# Patient Record
Sex: MALE | Race: WHITE | NOT HISPANIC OR LATINO | Employment: FULL TIME | URBAN - NONMETROPOLITAN AREA
[De-identification: names, ages, dates, MRNs, and addresses within clinical notes are randomized per-mention and may not be internally consistent; named-entity substitution may affect disease eponyms.]

---

## 2017-01-06 ENCOUNTER — HOSPITAL ENCOUNTER (EMERGENCY)
Facility: HOSPITAL | Age: 67
Discharge: HOME | End: 2017-01-06
Admitting: EMERGENCY MEDICINE
Payer: COMMERCIAL

## 2017-01-06 VITALS
DIASTOLIC BLOOD PRESSURE: 81 MMHG | BODY MASS INDEX: 30.57 KG/M2 | HEART RATE: 64 BPM | WEIGHT: 194.8 LBS | SYSTOLIC BLOOD PRESSURE: 185 MMHG | HEIGHT: 67 IN | OXYGEN SATURATION: 97 % | RESPIRATION RATE: 18 BRPM | TEMPERATURE: 97 F

## 2017-01-06 DIAGNOSIS — R10.30 INGUINAL PAIN: ICD-10-CM

## 2017-01-06 DIAGNOSIS — R31.9 HEMATURIA: Primary | ICD-10-CM

## 2017-01-06 LAB
BACTERIA UR QL AUTO: ABNORMAL /HPF
BILIRUB UR QL STRIP: NEGATIVE
CLARITY UR: ABNORMAL
COLOR UR: YELLOW
GLUCOSE UR STRIP-MCNC: NEGATIVE MG/DL
HGB UR QL STRIP.AUTO: ABNORMAL
KETONES UR STRIP-MCNC: NEGATIVE MG/DL
LEUKOCYTE ESTERASE UR QL STRIP: NEGATIVE
NITRITE UR QL STRIP: NEGATIVE
NON-SQ EPI CELLS URNS QL MICRO: ABNORMAL /HPF
PH UR STRIP.AUTO: 6 [PH] (ref 4.5–8)
PROT UR STRIP-MCNC: NEGATIVE MG/DL
RBC #/AREA URNS AUTO: ABNORMAL /HPF
SP GR UR STRIP.AUTO: 1.02 (ref 1–1.03)
UROBILINOGEN UR QL STRIP.AUTO: 0.2 E.U./DL
WBC #/AREA URNS AUTO: ABNORMAL /HPF

## 2017-01-06 PROCEDURE — 81001 URINALYSIS AUTO W/SCOPE: CPT | Performed by: PHYSICIAN ASSISTANT

## 2017-01-06 PROCEDURE — 99283 EMERGENCY DEPT VISIT LOW MDM: CPT

## 2017-01-06 PROCEDURE — 87086 URINE CULTURE/COLONY COUNT: CPT | Performed by: PHYSICIAN ASSISTANT

## 2017-01-06 RX ORDER — AMLODIPINE BESYLATE 2.5 MG/1
2.5 TABLET ORAL DAILY
COMMUNITY

## 2017-01-06 RX ORDER — ATORVASTATIN CALCIUM 40 MG/1
40 TABLET, FILM COATED ORAL DAILY
COMMUNITY

## 2017-01-06 RX ORDER — NITROGLYCERIN 0.4 MG/1
0.4 TABLET SUBLINGUAL DAILY
COMMUNITY

## 2017-01-06 RX ORDER — GABAPENTIN 300 MG/1
300 CAPSULE ORAL 3 TIMES DAILY
COMMUNITY
End: 2018-03-08

## 2017-01-06 RX ORDER — KETOCONAZOLE 20 MG/G
CREAM TOPICAL AS NEEDED
COMMUNITY

## 2017-01-06 RX ORDER — OMEPRAZOLE 40 MG/1
40 CAPSULE, DELAYED RELEASE ORAL DAILY
COMMUNITY
End: 2018-03-08

## 2017-01-06 RX ORDER — PIMECROLIMUS 10 MG/G
CREAM TOPICAL 2 TIMES DAILY PRN
COMMUNITY

## 2017-01-06 RX ORDER — METOPROLOL TARTRATE 50 MG/1
25 TABLET, FILM COATED ORAL EVERY 12 HOURS SCHEDULED
COMMUNITY

## 2017-01-07 LAB — BACTERIA UR CULT: NORMAL

## 2018-01-03 ENCOUNTER — APPOINTMENT (EMERGENCY)
Dept: RADIOLOGY | Facility: HOSPITAL | Age: 68
End: 2018-01-03
Payer: OTHER MISCELLANEOUS

## 2018-01-03 ENCOUNTER — APPOINTMENT (EMERGENCY)
Dept: CT IMAGING | Facility: HOSPITAL | Age: 68
End: 2018-01-03
Payer: OTHER MISCELLANEOUS

## 2018-01-03 ENCOUNTER — HOSPITAL ENCOUNTER (EMERGENCY)
Facility: HOSPITAL | Age: 68
Discharge: HOME | End: 2018-01-03
Payer: OTHER MISCELLANEOUS

## 2018-01-03 VITALS
WEIGHT: 176.15 LBS | BODY MASS INDEX: 27.59 KG/M2 | TEMPERATURE: 97.9 F | RESPIRATION RATE: 18 BRPM | OXYGEN SATURATION: 97 % | HEART RATE: 60 BPM | DIASTOLIC BLOOD PRESSURE: 71 MMHG | SYSTOLIC BLOOD PRESSURE: 135 MMHG

## 2018-01-03 DIAGNOSIS — S63.692A OTHER SPRAIN OF RIGHT MIDDLE FINGER, INITIAL ENCOUNTER: ICD-10-CM

## 2018-01-03 DIAGNOSIS — S09.90XA HEAD INJURY, CLOSED, WITHOUT LOC, INITIAL ENCOUNTER: Primary | ICD-10-CM

## 2018-01-03 DIAGNOSIS — S63.614A SPRAIN OF RIGHT RING FINGER, INITIAL ENCOUNTER: ICD-10-CM

## 2018-01-03 DIAGNOSIS — S60.411A ABRASION OF LEFT INDEX FINGER, INITIAL ENCOUNTER: ICD-10-CM

## 2018-01-03 DIAGNOSIS — S80.01XA CONTUSION OF RIGHT KNEE, INITIAL ENCOUNTER: ICD-10-CM

## 2018-01-03 DIAGNOSIS — S00.83XA FOREHEAD CONTUSION, INITIAL ENCOUNTER: ICD-10-CM

## 2018-01-03 LAB
ALBUMIN SERPL BCP-MCNC: 4.2 G/DL (ref 3.5–5)
ALP SERPL-CCNC: 93 U/L (ref 46–116)
ALT SERPL W P-5'-P-CCNC: 47 U/L (ref 12–78)
ANION GAP SERPL CALCULATED.3IONS-SCNC: 6 MMOL/L (ref 4–13)
APTT PPP: 31 SECONDS (ref 23–35)
AST SERPL W P-5'-P-CCNC: 27 U/L (ref 5–45)
BACTERIA UR QL AUTO: ABNORMAL /HPF
BASOPHILS # BLD AUTO: 0.01 THOUSANDS/ΜL (ref 0–0.1)
BASOPHILS NFR BLD AUTO: 0 % (ref 0–1)
BILIRUB SERPL-MCNC: 0.9 MG/DL (ref 0.2–1)
BILIRUB UR QL STRIP: NEGATIVE
BUN SERPL-MCNC: 9 MG/DL (ref 5–25)
CALCIUM SERPL-MCNC: 8.7 MG/DL (ref 8.3–10.1)
CHLORIDE SERPL-SCNC: 104 MMOL/L (ref 100–108)
CLARITY UR: CLEAR
CO2 SERPL-SCNC: 31 MMOL/L (ref 21–32)
COLOR UR: YELLOW
CREAT SERPL-MCNC: 0.99 MG/DL (ref 0.6–1.3)
EOSINOPHIL # BLD AUTO: 0.14 THOUSAND/ΜL (ref 0–0.61)
EOSINOPHIL NFR BLD AUTO: 2 % (ref 0–6)
ERYTHROCYTE [DISTWIDTH] IN BLOOD BY AUTOMATED COUNT: 13.8 % (ref 11.6–15.1)
GFR SERPL CREATININE-BSD FRML MDRD: 77 ML/MIN/1.73SQ M
GLUCOSE SERPL-MCNC: 91 MG/DL (ref 65–140)
GLUCOSE UR STRIP-MCNC: NEGATIVE MG/DL
HCT VFR BLD AUTO: 45.3 % (ref 36.5–49.3)
HGB BLD-MCNC: 16 G/DL (ref 12–17)
HGB UR QL STRIP.AUTO: ABNORMAL
HOLD SPECIMEN: NORMAL
INR PPP: 1.08 (ref 0.86–1.16)
KETONES UR STRIP-MCNC: ABNORMAL MG/DL
LEUKOCYTE ESTERASE UR QL STRIP: NEGATIVE
LYMPHOCYTES # BLD AUTO: 1.24 THOUSANDS/ΜL (ref 0.6–4.47)
LYMPHOCYTES NFR BLD AUTO: 18 % (ref 14–44)
MCH RBC QN AUTO: 32.3 PG (ref 26.8–34.3)
MCHC RBC AUTO-ENTMCNC: 35.3 G/DL (ref 31.4–37.4)
MCV RBC AUTO: 91 FL (ref 82–98)
MONOCYTES # BLD AUTO: 0.96 THOUSAND/ΜL (ref 0.17–1.22)
MONOCYTES NFR BLD AUTO: 14 % (ref 4–12)
NEUTROPHILS # BLD AUTO: 4.61 THOUSANDS/ΜL (ref 1.85–7.62)
NEUTS SEG NFR BLD AUTO: 66 % (ref 43–75)
NITRITE UR QL STRIP: NEGATIVE
NON-SQ EPI CELLS URNS QL MICRO: ABNORMAL /HPF
PH UR STRIP.AUTO: 7.5 [PH] (ref 4.5–8)
PLATELET # BLD AUTO: 202 THOUSANDS/UL (ref 149–390)
PMV BLD AUTO: 9.5 FL (ref 8.9–12.7)
POTASSIUM SERPL-SCNC: 3.6 MMOL/L (ref 3.5–5.3)
PROT SERPL-MCNC: 7.4 G/DL (ref 6.4–8.2)
PROT UR STRIP-MCNC: NEGATIVE MG/DL
PROTHROMBIN TIME: 13.9 SECONDS (ref 12.1–14.4)
RBC # BLD AUTO: 4.96 MILLION/UL (ref 3.88–5.62)
RBC #/AREA URNS AUTO: ABNORMAL /HPF
SODIUM SERPL-SCNC: 141 MMOL/L (ref 136–145)
SP GR UR STRIP.AUTO: 1.01 (ref 1–1.03)
UROBILINOGEN UR QL STRIP.AUTO: 0.2 E.U./DL
WBC # BLD AUTO: 6.96 THOUSAND/UL (ref 4.31–10.16)
WBC #/AREA URNS AUTO: ABNORMAL /HPF

## 2018-01-03 PROCEDURE — 71260 CT THORAX DX C+: CPT

## 2018-01-03 PROCEDURE — 85025 COMPLETE CBC W/AUTO DIFF WBC: CPT | Performed by: PHYSICIAN ASSISTANT

## 2018-01-03 PROCEDURE — 70450 CT HEAD/BRAIN W/O DYE: CPT

## 2018-01-03 PROCEDURE — 74177 CT ABD & PELVIS W/CONTRAST: CPT

## 2018-01-03 PROCEDURE — 81001 URINALYSIS AUTO W/SCOPE: CPT | Performed by: PHYSICIAN ASSISTANT

## 2018-01-03 PROCEDURE — 73110 X-RAY EXAM OF WRIST: CPT

## 2018-01-03 PROCEDURE — 73080 X-RAY EXAM OF ELBOW: CPT

## 2018-01-03 PROCEDURE — 36415 COLL VENOUS BLD VENIPUNCTURE: CPT | Performed by: PHYSICIAN ASSISTANT

## 2018-01-03 PROCEDURE — 85730 THROMBOPLASTIN TIME PARTIAL: CPT | Performed by: PHYSICIAN ASSISTANT

## 2018-01-03 PROCEDURE — 73564 X-RAY EXAM KNEE 4 OR MORE: CPT

## 2018-01-03 PROCEDURE — 80053 COMPREHEN METABOLIC PANEL: CPT | Performed by: PHYSICIAN ASSISTANT

## 2018-01-03 PROCEDURE — 70486 CT MAXILLOFACIAL W/O DYE: CPT

## 2018-01-03 PROCEDURE — 93005 ELECTROCARDIOGRAM TRACING: CPT | Performed by: PHYSICIAN ASSISTANT

## 2018-01-03 PROCEDURE — 73130 X-RAY EXAM OF HAND: CPT

## 2018-01-03 PROCEDURE — 72125 CT NECK SPINE W/O DYE: CPT

## 2018-01-03 PROCEDURE — 85610 PROTHROMBIN TIME: CPT | Performed by: PHYSICIAN ASSISTANT

## 2018-01-03 PROCEDURE — 99284 EMERGENCY DEPT VISIT MOD MDM: CPT

## 2018-01-03 RX ORDER — ACETAMINOPHEN 325 MG/1
650 TABLET ORAL EVERY 6 HOURS PRN
Qty: 30 TABLET | Refills: 0 | Status: SHIPPED | OUTPATIENT
Start: 2018-01-03

## 2018-01-03 RX ORDER — ACETAMINOPHEN 325 MG/1
650 TABLET ORAL ONCE
Status: COMPLETED | OUTPATIENT
Start: 2018-01-03 | End: 2018-01-03

## 2018-01-03 RX ADMIN — IOHEXOL 100 ML: 350 INJECTION, SOLUTION INTRAVENOUS at 19:11

## 2018-01-03 RX ADMIN — ACETAMINOPHEN 650 MG: 325 TABLET, FILM COATED ORAL at 20:40

## 2018-01-04 LAB
ATRIAL RATE: 56 BPM
P AXIS: 72 DEGREES
PR INTERVAL: 164 MS
QRS AXIS: 24 DEGREES
QRSD INTERVAL: 100 MS
QT INTERVAL: 442 MS
QTC INTERVAL: 426 MS
T WAVE AXIS: 28 DEGREES
VENTRICULAR RATE: 56 BPM

## 2018-01-04 NOTE — ED PROVIDER NOTES
History  Chief Complaint   Patient presents with    Fall     Pt fell from last step on ladder  Denies any LOC  20-year-old male presents to the emergency department today for evaluation following a head injury  Injury occurred approximately 5 hours ago  Patient states he was stepping down off of a step ladder at work he missed took the last step on the ladder striking the right side of his forehead against the ladder itself before falling to the ground onto his back  He denies any loss of consciousness  Patient is on 81 mg aspirin daily no other anticoagulation or antiplatelet agents  He does report a mild/moderate dull headache on the right forehead and behind the right eye  Has no associated blurred vision or double vision  No dizziness prior to or since the fall  Denies any neck or back pain  He is having pain in the right third and fourth fingers at the site where he tried to grab onto the ladder and struck the knuckles  He is also having pain in the right anterior knee where he struck the ground  He is able to ambulate without difficulty  He denies prior history of head injury, neck injury or other spinal surgery  Patient had initially presented to another facility with his co-workers for evaluation of the injury, however he waited in the waiting room for several hours without being seen, at that point he decided to come home with his wife in be seen at this facility as it is closer to home  This injury will be a worker's Comp claim  Patient denies any difficulty breathing, cough or rib pain  He does not have abdominal pain  He has had normal urination since the time of injury, not had a bowel movement since time of injury  He did eat a small snack and had fluids fine  History provided by:  Patient, spouse and medical records      Prior to Admission Medications   Prescriptions Last Dose Informant Patient Reported? Taking?    Calcium Citrate-Vitamin D (CALCIUM CITRATE + PO)   Yes No Sig: Take 500 mg by mouth daily   Cholecalciferol 1000 UNITS CHEW   Yes No   Sig: Chew 1,000 Units daily   amLODIPine (NORVASC) 2 5 mg tablet   Yes No   Sig: Take 2 5 mg by mouth daily   aspirin 81 MG tablet   Yes No   Sig: Take 81 mg by mouth daily   atorvastatin (LIPITOR) 40 mg tablet   Yes No   Sig: Take 40 mg by mouth daily   co-enzyme Q-10 30 MG capsule   Yes No   Sig: Take 100 mg by mouth daily   gabapentin (NEURONTIN) 300 mg capsule   Yes No   Sig: Take 300 mg by mouth 3 (three) times a day   ketoconazole (NIZORAL) 2 % cream   Yes No   Sig: Apply topically as needed for itching   metoprolol tartrate (LOPRESSOR) 50 mg tablet   Yes No   Sig: Take 50 mg by mouth daily   nitroglycerin (NITROSTAT) 0 4 mg SL tablet   Yes No   Sig: Place 0 4 mg under the tongue daily   omeprazole (PriLOSEC) 40 MG capsule   Yes No   Sig: Take 40 mg by mouth daily   pimecrolimus (ELIDEL) 1 % cream   Yes No   Sig: Apply topically 2 (two) times a day as needed      Facility-Administered Medications: None       Past Medical History:   Diagnosis Date    GERD (gastroesophageal reflux disease)     Hypertension     Prostate cancer ([facility] [address] )        Past Surgical History:   Procedure Laterality Date    APPENDECTOMY      CARDIAC SURGERY      TONSILLECTOMY AND ADENOIDECTOMY         History reviewed  No pertinent family history  I have reviewed and agree with the history as documented  Social History   Substance Use Topics    Smoking status: Never Smoker    Smokeless tobacco: Never Used    Alcohol use No        Review of Systems   Constitutional: Negative for activity change, appetite change, chills, diaphoresis, fatigue, fever and unexpected weight change  HENT: Negative for congestion, ear pain, rhinorrhea, sinus pressure, sore throat and tinnitus  Eyes: Negative for visual disturbance  Respiratory: Negative for cough, chest tightness, shortness of breath and wheezing      Cardiovascular: Negative for chest pain, palpitations and leg swelling  Gastrointestinal: Negative for abdominal pain, constipation, diarrhea, nausea and vomiting  Genitourinary: Negative for dysuria, flank pain, frequency, hematuria and urgency  Musculoskeletal: Positive for arthralgias and joint swelling  Negative for back pain, gait problem and myalgias  Skin: Negative for rash and wound  Neurological: Positive for headaches  Negative for dizziness, tremors, seizures, syncope, facial asymmetry, speech difficulty, weakness, light-headedness and numbness  Physical Exam  ED Triage Vitals   Temperature Pulse Respirations Blood Pressure SpO2   01/03/18 1850 01/03/18 1850 01/03/18 1850 01/03/18 1850 01/03/18 1850   97 9 °F (36 6 °C) 55 17 (!) 187/83 99 %      Temp Source Heart Rate Source Patient Position - Orthostatic VS BP Location FiO2 (%)   01/03/18 1850 01/03/18 1850 01/03/18 1850 -- --   Temporal Monitor Lying        Pain Score       01/03/18 2040       5           Orthostatic Vital Signs  Vitals:    01/03/18 1900 01/03/18 1915 01/03/18 1930 01/03/18 1945   BP: 151/72 132/72 137/72 139/75   Pulse: 57 57 56 58   Patient Position - Orthostatic VS:           Physical Exam   Constitutional: He is oriented to person, place, and time  He appears well-developed and well-nourished  No distress  Airway patent cervical spine precautions initiated, cervical collar in place  Breathing spontaneous nonlabored, BS normal and symmetric  Peripheral and central pulses +2 x 4 extremities  GCS 15  CAOx3 following commands  PERRL  Sensation intact x 4 extremities to light touch  Moving x4 extremities with purpose and on command  Exposure completed  Skin pink and warm  +tenderness low cervical upper thoracic spine  No  lumbar spine tenderness or stepoff  Perineum no ecchymosis or bleeding  Few abrasions left fingers no other open wounds  HENT:   Head: Normocephalic and atraumatic     Nose: Nose normal    Mouth/Throat: Oropharynx is clear and moist    Normal jaw occlusion no tenderness   Eyes: Conjunctivae and EOM are normal  Pupils are equal, round, and reactive to light  Cardiovascular: Normal rate, regular rhythm, normal heart sounds and intact distal pulses  No murmur heard  Pulmonary/Chest: Effort normal and breath sounds normal  No respiratory distress  He exhibits no tenderness  Abdominal: Soft  Bowel sounds are normal  He exhibits no distension  There is no tenderness  Musculoskeletal: He exhibits tenderness  He exhibits no edema  Cervical back: He exhibits tenderness and bony tenderness  He exhibits normal range of motion, no swelling and no deformity  Thoracic back: He exhibits normal range of motion, no tenderness and no bony tenderness  Lumbar back: He exhibits normal range of motion, no tenderness and no bony tenderness  Back:         Right hand: He exhibits tenderness (right 3rd and 4th PIP), bony tenderness and swelling (right 3rd and 4th pip)  He exhibits normal range of motion, no deformity and no laceration  Normal sensation noted  Normal strength noted  Left hand: He exhibits normal range of motion, no tenderness, no bony tenderness and no swelling  Normal sensation noted  Normal strength noted  Hands:  Neurological: He is alert and oriented to person, place, and time  No dysarthria, nystagmus, dysphagia, diplopia, aphasia, vertigo, ataxia, visions loss, dysmetria  Normal finger to nose, gait,  strength and sensation in bilat upper and lower extremities  No pronator drift  Normal heel to shin  EOMI, no visual field defects  CN 2-13 intact  GCS 15  AOx3  Normal babinski  Skin: Skin is warm and dry  He is not diaphoretic  Psychiatric: He has a normal mood and affect  Nursing note and vitals reviewed        ED Medications  Medications   iohexol (OMNIPAQUE) 350 MG/ML injection (SINGLE-DOSE) 100 mL (100 mL Intravenous Given 1/3/18 1911)   acetaminophen (TYLENOL) tablet 650 mg (650 mg Oral Given 1/3/18 2040)       Diagnostic Studies  Results Reviewed     Procedure Component Value Units Date/Time    [place] draw [[id number]] Collected:  01/03/18 1852    Lab Status: In process Specimen:  Blood Updated:  01/03/18 2101    Narrative: The following orders were created for panel order [place] draw  Procedure                               Abnormality         Status                     ---------                               -----------         ------                     Ester Bilis top on [facility][[id number]]                                  In process                 Green / Black tube on [facility][[id number]]                        Final result                 Please view results for these tests on the individual orders      Urine Microscopic [[id number]]  (Abnormal) Collected:  01/03/18 2037    Lab Status:  Final result Specimen:  Urine from Urine, Clean Catch Updated:  01/03/18 2051     RBC, UA 1-2 (A) /hpf      WBC, UA 0-1 (A) /hpf      Epithelial Cells Occasional /hpf      Bacteria, UA None Seen /hpf     UA w Reflex to Microscopic w Reflex to Culture [[id number]]  (Abnormal) Collected:  01/03/18 2037    Lab Status:  Final result Specimen:  Urine from Urine, Clean Catch Updated:  01/03/18 2044     Color, UA Yellow     Clarity, UA Clear     Specific Gravity, UA 1 010     pH, UA 7 5     Leukocytes, UA Negative     Nitrite, UA Negative     Protein, UA Negative mg/dl      Glucose, UA Negative mg/dl      Ketones, UA Trace (A) mg/dl      Urobilinogen, UA 0 2 E U /dl      Bilirubin, UA Negative     Blood, UA Trace-lysed (A)    Comprehensive metabolic panel [[id number]] Collected:  01/03/18 1852    Lab Status:  Final result Specimen:  Blood from Arm, Right Updated:  01/03/18 1934     Sodium 141 mmol/L      Potassium 3 6 mmol/L      Chloride 104 mmol/L      CO2 31 mmol/L      Anion Gap 6 mmol/L      BUN 9 mg/dL      Creatinine 0 99 mg/dL      Glucose 91 mg/dL      Calcium 8 7 mg/dL      AST 27 U/L      ALT 47 U/L      Alkaline Phosphatase 93 U/L      Total Protein 7 4 g/dL      Albumin 4 2 g/dL      Total Bilirubin 0 90 mg/dL      eGFR 77 ml/min/1 73sq m     Narrative:         National Kidney Disease Education Program recommendations are as follows:  GFR calculation is accurate only with a steady state creatinine  Chronic Kidney disease less than 60 ml/min/1 73 sq  meters  Kidney failure less than 15 ml/min/1 73 sq  meters  Paola Yang [[id number]]  (Normal) Collected:  01/03/18 1852    Lab Status:  Final result Specimen:  Blood from Arm, Right Updated:  01/03/18 1924     Protime 13 9 seconds      INR 1 08    APTT [[id number]]  (Normal) Collected:  01/03/18 1852    Lab Status:  Final result Specimen:  Blood from Arm, Right Updated:  01/03/18 1924     PTT 31 seconds     Narrative:          Therapeutic Heparin Range = 60-90 seconds    CBC and differential [[id number]]  (Abnormal) Collected:  01/03/18 1852    Lab Status:  Final result Specimen:  Blood from Arm, Right Updated:  01/03/18 1914     WBC 6 96 Thousand/uL      RBC 4 96 Million/uL      Hemoglobin 16 0 g/dL      Hematocrit 45 3 %      MCV 91 fL      MCH 32 3 pg      MCHC 35 3 g/dL      RDW 13 8 %      MPV 9 5 fL      Platelets 038 Thousands/uL      Neutrophils Relative 66 %      Lymphocytes Relative 18 %      Monocytes Relative 14 (H) %      Eosinophils Relative 2 %      Basophils Relative 0 %      Neutrophils Absolute 4 61 Thousands/µL      Lymphocytes Absolute 1 24 Thousands/µL      Monocytes Absolute 0 96 Thousand/µL      Eosinophils Absolute 0 14 Thousand/µL      Basophils Absolute 0 01 Thousands/µL                  XR elbow 3+ vw LEFT   Final Result by Elenore Hashimoto, MD (01/03 2014)   No acute displaced fracture or dislocation seen         Workstation performed: [id number]         XR wrist 3+ views LEFT   Final Result by Elenore Hashimoto, MD (01/03 2012)      No acute displaced fracture or dislocation seen         Workstation performed: [id number]         XR hand 3+ views RIGHT   Final Result by Suresh Kaiser MD (01/03 2012)      No acute displaced fracture or dislocation seen         Workstation performed: [id number]         XR knee 4+ vw right injury   Final Result by Suresh Kaiser MD (01/03 1951)      No acute displaced fracture or description seen         Workstation performed: [id number]         CT facial bones without contrast   Final Result by Christine Lima DO (01/03 1949)      No facial bone fracture  Workstation performed: [id number]         CT chest abdomen pelvis w contrast   Final Result by Nicky Jasmine MD (01/03 1940)      No evidence of acute trauma in the chest, abdomen or pelvis  Workstation performed: [id number]         CT cervical spine without contrast   Final Result by Nicky Jasmine MD (01/03 1933)      No cervical spine fracture or traumatic malalignment  Workstation performed: [id number]         CT head without contrast   Final Result by Nicky Jasmine MD (01/03 1930)      No acute intracranial, mass effect or extra-axial collection           Workstation performed: [id number]                    Procedures  ECG 12 Lead Documentation  Date/Time: 1/3/2018 7:53 PM  Performed by: Brodie Blevins  Authorized by: Brodie Blevins     Indications / Diagnosis:  Trauma  ECG reviewed by me, the ED Provider: yes    Patient location:  ED  Rate:     ECG rate:  56  Rhythm:     Rhythm: sinus bradycardia    Ectopy:     Ectopy: none    QRS:     QRS axis:  Normal  Conduction:     Conduction: normal    ST segments:     ST segments:  Normal  T waves:     T waves: normal             Phone Contacts  ED Phone Contact    ED Course  ED Course as of Jan 03 2114 Wed Jan 03, 2018   1845 Trauma eval called 6:45 PM      2013 PTT: 31   2014 Protime: 13 9   2014 INR: 1 08   2014 WBC: 6 96   2014 Hemoglobin: 16 0   2014 Hematocrit: 45 3   2014 Platelets: 273   8742 Sodium: 141   2014 Potassium: 3 6   2014 Chloride: 104   2014 CO2: 31   2014 Anion Gap: 6   2014 BUN: 9   2014 Creatinine: 0 99   2014 Glucose: 91   2014 eGFR: 77   2014 CT and XR imaging studies reviewed no acute abnormality/fx/solid organ injury  2022 Reviewed lab and imaging results with pt and wife at bedside  Cervical collar removed by me  FROM without pain  Point tenderness correlates to site of C7 osteophyte  Motor and sensation remains intact x 4 extremities  Pt reports feeling well but feels cold  Reviewed tx plan and discharge precautions  All questions answered at this time  2032 Pt providing URINE specimen at this time  MDM  Number of Diagnoses or Management Options  Abrasion of left index finger, initial encounter: new and does not require workup  Contusion of right knee, initial encounter: new and requires workup  Forehead contusion, initial encounter: new and requires workup  Head injury, closed, without LOC, initial encounter: new and requires workup  Other sprain of right middle finger, initial encounter: new and requires workup  Sprain of right ring finger, initial encounter: new and requires workup     Amount and/or Complexity of Data Reviewed  Clinical lab tests: ordered and reviewed  Tests in the radiology section of CPT®: ordered and reviewed  Obtain history from someone other than the patient: yes  Discuss the patient with other providers: yes  Independent visualization of images, tracings, or specimens: yes    Patient Progress  Patient progress: stable    The patient presented with a condition in which there was a high probability of imminent or life-threatening deterioration, and critical care services (excluding separately billable procedures) totalled 30-74 minutes (trauma evaluation)          Disposition  Final diagnoses:   Head injury, closed, without LOC, initial encounter   Forehead contusion, initial encounter   Other sprain of right middle finger, initial encounter   Sprain of right ring finger, initial encounter   Abrasion of left index finger, initial encounter   Contusion of right knee, initial encounter     Time reflects when diagnosis was documented in both MDM as applicable and the Disposition within this note     Time User Action Codes Description Comment    1/3/2018  8:52 PM Lloyd Grates Add [N68 [id number]] Head injury, closed, without LOC, initial encounter     1/3/2018  8:52 PM Carlos, Danica [address] Forehead contusion, initial encounter     1/3/2018  8:52 PM [place] Grates Add [[id number]] Other sprain of right middle finger, initial encounter     1/3/2018  8:52 PM [place] Grates Add [[id number]] Sprain of right ring finger, initial encounter     1/3/2018  8:52 PM [place] Grates Add [[id number]] Abrasion of left index finger, initial encounter     1/3/2018  8:53 PM Lloyd Grates Add [[id number]] Contusion of right knee, initial encounter       ED Disposition     ED Disposition Condition Comment    Discharge  Baldemar Meier discharge to home/self care  Condition at discharge: Good        Follow-up Information     Follow up With Specialties Details Why [address] MD Family Medicine Schedule an appointment as soon as possible for a visit Seen in ER need followup for injury [address] [facility] [id number] [phone]      Ask your employer who they use for Occupational Medicine/Work Comp followup  Schedule an appointment as soon as possible for a visit Followup Workers Compensation Injury         Patient's Medications   Discharge Prescriptions    ACETAMINOPHEN (TYLENOL) 325 MG TABLET    Take 2 tablets by mouth every 6 (six) hours as needed (pain/headaches)       Start Date: 1/3/2018  End Date: --       Order Dose: 650 mg       Quantity: 30 tablet    Refills: 0     No discharge procedures on file      ED Provider  Electronically Signed by           Carolynn Fletcher PA-C  01/03/18 2729

## 2018-01-04 NOTE — TRAUMA DOCUMENTATION
Pt reports right eye pain, RT forehead pain, Rt 3rd & 4th digit on hand pain and swelling  Rt knee pain  No open areas in any of above areas

## 2018-01-04 NOTE — DISCHARGE INSTRUCTIONS
Head Injury   WHAT YOU NEED TO KNOW:   A head injury is most often caused by a blow to the head  This may occur from a fall, bicycle injury, sports injury, being struck in the head, or a motor vehicle accident  DISCHARGE INSTRUCTIONS:   Call 911 or have someone else call for any of the following:   · You cannot be woken  · You have a seizure  · You stop responding to others or you faint  · You have blurry or double vision  · Your speech becomes slurred or confused  · You have arm or leg weakness, loss of feeling, or new problems with coordination  · Your pupils are larger than usual or one pupil is a different size than the other  · You have blood or clear fluid coming out of your ears or nose  Return to the emergency department if:   · You have repeated or forceful vomiting  · You feel confused  · Your headache gets worse or becomes severe  · You or someone caring for you notices that you are harder to wake than usual   Contact your healthcare provider if:   · Your symptoms last longer than 6 weeks after the injury  · You have questions or concerns about your condition or care  Medicines:   · Acetaminophen  decreases pain  Acetaminophen is available without a doctor's order  Ask how much to take and how often to take it  Follow directions  Acetaminophen can cause liver damage if not taken correctly  · Take your medicine as directed  Contact your healthcare provider if you think your medicine is not helping or if you have side effects  Tell him or her if you are allergic to any medicine  Keep a list of the medicines, vitamins, and herbs you take  Include the amounts, and when and why you take them  Bring the list or the pill bottles to follow-up visits  Carry your medicine list with you in case of an emergency  Self-care:   · Rest  or do quiet activities for 24 to 48 hours  Limit your time watching TV, using the computer, or doing tasks that require a lot of thinking  Slowly return to your normal activities as directed  Do not play sports or do activities that may cause you to get hit in the head  Ask your healthcare provider when you can return to sports  · Apply ice  on your head for 15 to 20 minutes every hour or as directed  Use an ice pack, or put crushed ice in a plastic bag  Cover it with a towel before you apply it to your skin  Ice helps prevent tissue damage and decreases swelling and pain  · Have someone stay with you for 24 hours  or as directed  This person can monitor you for complications and call 006  When you are awake the person should ask you a few questions to see if you are thinking clearly  An example would be to ask your name or your address  Prevent another head injury:   · Wear a helmet that fits properly  Do this when you play sports, or ride a bike, scooter, or skateboard  Helmets help decrease your risk of a serious head injury  Talk to your healthcare provider about other ways you can protect yourself if you play sports  · Wear your seat belt every time you are in a car  This helps to decrease your risk for a head injury if you are in a car accident  Follow up with your healthcare provider as directed:  Write down your questions so you remember to ask them during your visits  © 2017 [address] [employer] Information is for End User's use only and may not be sold, redistributed or otherwise used for commercial purposes  All illustrations and images included in CareNotes® are the copyrighted property of A [employer] M [employer]  or [employer]  The above information is an  only  It is not intended as medical advice for individual conditions or treatments  Talk to your doctor, nurse or pharmacist before following any medical regimen to see if it is safe and effective for you  Contusion in Adults   WHAT YOU NEED TO KNOW:   A contusion is a bruise that appears on your skin after an injury   A bruise happens when small blood vessels tear but skin does not  When blood vessels tear, blood leaks into nearby tissue, such as soft tissue or muscle  DISCHARGE INSTRUCTIONS:   Return to the emergency department if:   · You have new trouble moving the injured area  · You have tingling or numbness in or near the injured area  · Your hand or foot below the bruise gets cold or turns pale  Contact your healthcare provider if:   · You find a new lump in the injured area  · Your symptoms do not improve with treatment after 4 to 5 days  · You have questions or concerns about your condition or care  Medicines: You may need any of the following:  · NSAIDs  help decrease swelling and pain or fever  This medicine is available with or without a doctor's order  NSAIDs can cause stomach bleeding or kidney problems in certain people  If you take blood thinner medicine, always ask your healthcare provider if NSAIDs are safe for you  Always read the medicine label and follow directions  · Prescription pain medicine  may be given  Do not wait until the pain is severe before you take your medicine  · Take your medicine as directed  Contact your healthcare provider if you think your medicine is not helping or if you have side effects  Tell him of her if you are allergic to any medicine  Keep a list of the medicines, vitamins, and herbs you take  Include the amounts, and when and why you take them  Bring the list or the pill bottles to follow-up visits  Carry your medicine list with you in case of an emergency  Follow up with your healthcare provider as directed: You may need to return within a week to check your injury again  Write down your questions so you remember to ask them during your visits  Help a contusion heal:   · Rest the injured area  or use it less than usual  If you bruised your leg or foot, you may need crutches or a cane to help you walk  This will help you keep weight off your injured body part  · Apply ice  to decrease swelling and pain  Ice may also help prevent tissue damage  Use an ice pack, or put crushed ice in a plastic bag  Cover it with a towel and place it on your bruise for 15 to 20 minutes every hour or as directed  · Use compression  to support the area and decrease swelling  Wrap an elastic bandage around the area over the bruised muscle  Make sure the bandage is not too tight  You should be able to fit 1 finger between the bandage and your skin  · Elevate (raise) your injured body part  above the level of your heart to help decrease pain and swelling  Use pillows, blankets, or rolled towels to elevate the area as often as you can  · Do not drink alcohol  as directed  Alcohol may slow healing  · Do not stretch injured muscles  right after your injury  Ask your healthcare provider when and how you may safely stretch after your injury  Gentle stretches can help increase your flexibility  · Do not massage the area or put heating pads  on the bruise right after your injury  Heat and massage may slow healing  Your healthcare provider may tell you to apply heat after several days  At that time, heat will start to help the injury heal   Prevent another contusion:   · Stretch and warm up before you play sports or exercise  · Wear protective gear when you play sports  Examples are shin guards and padding  · If you begin a new physical activity, start slowly to give your body a chance to adjust   © 2017 [address] [employer] Information is for End User's use only and may not be sold, redistributed or otherwise used for commercial purposes  All illustrations and images included in CareNotes® are the copyrighted property of A D A M [employer]  or [address]  The above information is an  only  It is not intended as medical advice for individual conditions or treatments   Talk to your doctor, nurse or pharmacist before following any medical regimen to see if it is safe and effective for you  Finger Sprain   WHAT YOU NEED TO KNOW:   A finger sprain happens when ligaments in your finger or thumb are stretched or torn  Ligaments are the tough tissues that connect bones  Ligaments allow your hands to grasp and pinch  DISCHARGE INSTRUCTIONS:   Return to the emergency department if:   · The skin on your injured finger looks bluish or pale (less color than normal)  · You have new weakness or numbness in your finger or thumb  It may tingle or burn  · You have a splint that you cannot adjust and it feels too tight  Contact your healthcare provider if:   · You have new or increased swelling or pain in your finger  · You have new or increased stiffness when you move your injured finger  · You have questions or concerns about your injury or treatment  Medicines:   · Pain medicine  may be given  Do not wait until the pain is severe before taking your medicine  · Take your medicine as directed  Call your healthcare provider if you think your medicines are not helping or if you have side effects  Tell him if you take vitamins, herbs, or any other medicines  Keep a written list of your medicines  Include the amounts, and when and why you take them  Bring the list or the pill bottles to follow-up visits  Care for your finger:   · Rest  your finger for at least 48 hours  Do not do activities that cause pain  Return to normal activities as directed  · Apply ice  on your finger to help decrease pain and swelling  Put crushed ice in a plastic bag and cover it with a towel  Put the ice on your injured finger or thumb every hour for 15 to 20 minutes at a time  You may need to ice the area at least 4 to 8 times each day  Ice your finger for as many days as directed  · Elevate your finger  above the level of your heart as often as you can  This will help decrease swelling and pain  You can elevate your hand by resting it on a pillow       · Use a splint or compression as directed  Compression (tight hold) helps support your finger or thumb as it heals  Tape your injured finger to the finger beside it  Severe sprains may be treated with a splint  A splint prevents your finger from moving while it heals  Ask how long you must wear the splint or tape, and how to apply them  · Do exercises as directed  You may be given gentle exercises to begin in a few days  Exercises can help decrease stiffness in your finger or thumb  Exercises also help decrease pain and swelling and improve the movement of your finger or thumb  Check with your healthcare provider before you return to your normal activities or sports  Follow up with your healthcare provider as directed:  Write down any questions you may have to ask at your follow up visits  © 2017 [address] [facility] Information is for End User's use only and may not be sold, redistributed or otherwise used for commercial purposes  All illustrations and images included in CareNotes® are the copyrighted property of A [employer] M [employer]  or [employer]  The above information is an  only  It is not intended as medical advice for individual conditions or treatments  Talk to your doctor, nurse or pharmacist before following any medical regimen to see if it is safe and effective for you

## 2018-01-15 ENCOUNTER — OFFICE VISIT (OUTPATIENT)
Dept: URGENT CARE | Facility: CLINIC | Age: 68
End: 2018-01-15
Payer: OTHER MISCELLANEOUS

## 2018-01-15 PROCEDURE — 99283 EMERGENCY DEPT VISIT LOW MDM: CPT

## 2018-01-15 PROCEDURE — G0382 LEV 3 HOSP TYPE B ED VISIT: HCPCS

## 2018-01-23 ENCOUNTER — APPOINTMENT (OUTPATIENT)
Dept: RADIOLOGY | Facility: CLINIC | Age: 68
End: 2018-01-23
Payer: OTHER MISCELLANEOUS

## 2018-01-23 ENCOUNTER — GENERIC CONVERSION - ENCOUNTER (OUTPATIENT)
Dept: OTHER | Facility: OTHER | Age: 68
End: 2018-01-23

## 2018-01-23 ENCOUNTER — APPOINTMENT (OUTPATIENT)
Dept: URGENT CARE | Facility: CLINIC | Age: 68
End: 2018-01-23
Payer: OTHER MISCELLANEOUS

## 2018-01-23 DIAGNOSIS — R52 PAIN: ICD-10-CM

## 2018-01-23 PROCEDURE — 73140 X-RAY EXAM OF FINGER(S): CPT

## 2018-01-23 PROCEDURE — 99213 OFFICE O/P EST LOW 20 MIN: CPT

## 2018-02-01 ENCOUNTER — OFFICE VISIT (OUTPATIENT)
Dept: OBGYN CLINIC | Facility: CLINIC | Age: 68
End: 2018-02-01
Payer: OTHER MISCELLANEOUS

## 2018-02-01 ENCOUNTER — APPOINTMENT (OUTPATIENT)
Dept: RADIOLOGY | Facility: CLINIC | Age: 68
End: 2018-02-01

## 2018-02-01 VITALS
SYSTOLIC BLOOD PRESSURE: 144 MMHG | BODY MASS INDEX: 28.57 KG/M2 | DIASTOLIC BLOOD PRESSURE: 78 MMHG | WEIGHT: 177.8 LBS | RESPIRATION RATE: 16 BRPM | HEIGHT: 66 IN | HEART RATE: 80 BPM

## 2018-02-01 DIAGNOSIS — M25.531 RIGHT WRIST PAIN: ICD-10-CM

## 2018-02-01 DIAGNOSIS — S62.652A NONDISPLACED FRACTURE OF MIDDLE PHALANX OF RIGHT MIDDLE FINGER, INITIAL ENCOUNTER FOR CLOSED FRACTURE: Primary | ICD-10-CM

## 2018-02-01 PROCEDURE — 99203 OFFICE O/P NEW LOW 30 MIN: CPT | Performed by: FAMILY MEDICINE

## 2018-02-01 PROCEDURE — 73110 X-RAY EXAM OF WRIST: CPT

## 2018-02-01 NOTE — LETTER
February 6, 2018     Patient: Jerry Jones   YOB: 1950   Date of Visit: 2/1/2018       To Whom it May Concern: Jerry Jones is under my professional care  He was seen in my office on 2/1/2018  He may continue with work with limitations: He must wear right middle finger splint at all times  Please make accommodations at work accordingly - No lifting or pulling greater than 10 pounds in the right hand until cleared by physician  If you have any questions or concerns, please don't hesitate to call           Sincerely,          [employer], DO        CC: No Recipients

## 2018-02-01 NOTE — PATIENT INSTRUCTIONS
Finger Fracture   AMBULATORY CARE:   A finger fracture  is a break in 1 or more of the bones in your finger  It is most commonly caused by a direct blow to the finger  Common signs and symptoms include the following:   · Pain, bruising, or swelling    · Weakness or numbness    · Trouble moving your finger    · Finger looks abnormally shaped  Seek care immediately if:   · Your cast or splint gets wet, damaged, or comes off  · Your splint or cast feels too tight  · You have severe pain  · Your injured finger is numb, cold, or pale  Contact your healthcare provider or hand specialist if:   · Your pain or swelling gets worse, even after treatment  · You have questions or concerns about your condition or care  Treatment:   · A cast or splint  may be needed to prevent movement and protect your finger so it can heal      · NSAIDs , such as ibuprofen, help decrease swelling, pain, and fever  This medicine is available with or without a doctor's order  NSAIDs can cause stomach bleeding or kidney problems in certain people  If you take blood thinner medicine, always ask your healthcare provider if NSAIDs are safe for you  Always read the medicine label and follow directions  · Acetaminophen  decreases pain and fever  It is available without a doctor's order  Ask how much to take and how often to take it  Follow directions  Acetaminophen can cause liver damage if not taken correctly  · Prescription pain medicine  may be given  Ask your healthcare provider how to take this medicine safely  Some prescription pain medicines contain acetaminophen  Do not take other medicines that contain acetaminophen without talking to your healthcare provider  Too much acetaminophen may cause liver damage  Prescription pain medicine may cause constipation  Ask your healthcare provider how to prevent or treat constipation  · Closed reduction  may be done to put your bones back into their correct position without surgery  · Open reduction surgery  may be needed to put your bones back into the correct position  An incision is made and the bones are put back in the correct position  This may include the use of special wires, pins, plates or screws  These help keep the broken pieces lined up so your finger can heal correctly  Self-care:   · Wear your splint as directed  Do not remove your splint until you follow up with your healthcare provider or hand specialist      · Apply ice  on your finger for 15 to 20 minutes every hour or as directed  Use an ice pack, or put crushed ice in a plastic bag  Cover it with a towel before you apply it to your skin  Ice helps prevent tissue damage and decreases swelling and pain  · Elevate  your finger above the level of your heart as often as you can  This will help decrease swelling and pain  Prop your hand on pillows or blankets to keep it elevated comfortably  · Go to physical therapy as directed  A physical therapist teaches you exercises to help improve movement and strength, and to decrease pain  Follow up with your healthcare provider or hand specialist within 2 days:  Write down your questions so you remember to ask them during your visits  © 2017 26[address] [facility] Information is for End User's use only and may not be sold, redistributed or otherwise used for commercial purposes  All illustrations and images included in CareNotes® are the copyrighted property of [employer]  or [address]  The above information is an  only  It is not intended as medical advice for individual conditions or treatments  Talk to your doctor, nurse or pharmacist before following any medical regimen to see if it is safe and effective for you

## 2018-02-02 ENCOUNTER — TELEPHONE (OUTPATIENT)
Dept: OBGYN CLINIC | Facility: CLINIC | Age: 68
End: 2018-02-02

## 2018-02-02 NOTE — TELEPHONE ENCOUNTER
Dr Parth Mejía from [address] [place] [address] for Shad Bird is requesting a back to work note to be faxed to her attention @ [phone]  Thank you

## 2018-02-08 ENCOUNTER — OFFICE VISIT (OUTPATIENT)
Dept: URGENT CARE | Facility: CLINIC | Age: 68
End: 2018-02-08
Payer: OTHER MISCELLANEOUS

## 2018-02-08 PROCEDURE — 99213 OFFICE O/P EST LOW 20 MIN: CPT

## 2018-03-01 ENCOUNTER — APPOINTMENT (OUTPATIENT)
Dept: RADIOLOGY | Facility: CLINIC | Age: 68
End: 2018-03-01
Payer: OTHER MISCELLANEOUS

## 2018-03-01 ENCOUNTER — OFFICE VISIT (OUTPATIENT)
Dept: OBGYN CLINIC | Facility: CLINIC | Age: 68
End: 2018-03-01

## 2018-03-01 VITALS
HEIGHT: 66 IN | DIASTOLIC BLOOD PRESSURE: 84 MMHG | BODY MASS INDEX: 28.73 KG/M2 | HEART RATE: 70 BPM | WEIGHT: 178.8 LBS | SYSTOLIC BLOOD PRESSURE: 118 MMHG | RESPIRATION RATE: 12 BRPM

## 2018-03-01 DIAGNOSIS — S62.652G: Primary | ICD-10-CM

## 2018-03-01 DIAGNOSIS — M79.644 PAIN OF RIGHT MIDDLE FINGER: ICD-10-CM

## 2018-03-01 PROCEDURE — 99213 OFFICE O/P EST LOW 20 MIN: CPT | Performed by: FAMILY MEDICINE

## 2018-03-01 PROCEDURE — 73140 X-RAY EXAM OF FINGER(S): CPT

## 2018-03-01 NOTE — PROGRESS NOTES
Assessment/Plan:  Assessment/Plan   Diagnoses and all orders for this visit:    Closed nondisplaced fracture of middle phalanx of right middle finger with delayed healing, subsequent encounter  -     XR finger right third digit-middle  -     MRI finger right wo contrast; Future    Pain of right middle finger  -     MRI finger right wo contrast; Future      69year old right hand dominant male with right middle finger injury  Discussed with patient physical exam, radiographs, impression, and plan  X-ray review shows persistence of the fracture line at the base of the middle phalanx with intra-articular extension  There is also moderate tenderness with palpation of the volar aspect of the PIP joint, and limited passive range of motion of the PIP joint due to significant pain  The X-ray findings and physical exam raise concern for volar plate injury  I will refer him for MRI to evaluate for volar plate injury of the right middle finger  He is to continue with wearing of the finger splint for protection, and limit removing the splint  I will see him for follow-up after getting MRI done  Subjective:   Patient ID: Ivan Mishra is a 69 y o  male  Chief Complaint   Patient presents with    Right Middle Finger - Follow-up, Pain, Swelling         69year old right hand dominant male following up for right middle finger fracture  Patient was seen 4 weeks ago and found to have nondisplaced fracture of the middle finger middle phalanx  He was advised to continue with wearing of the finger splint and limit removal of the splint  Today he reports no pain of the finer while at rest, and swelling of the finger as resolved  He has removed the splint only a few times for hygiene and to change the tape  He had caught the finger in some clothing on an occasion which caused significant pain  Since previous visit he has not had any falls or direct trauma to the finger    With the splint removed he reports some pain at the volar aspect of the right middle finger PIP with active finger flexion, as well as stiffness of the finger      Hand Injury   This is a new problem  The current episode started more than 1 month ago  The problem occurs intermittently  The problem has been gradually improving  Associated symptoms include arthralgias  Pertinent negatives include no abdominal pain, chest pain, chills, fever, joint swelling, numbness, rash, sore throat or weakness  Exacerbated by: Finger movement  Review of Systems   Constitutional: Negative for chills and fever  HENT: Negative for sore throat  Eyes: Negative for visual disturbance  Respiratory: Negative for shortness of breath  Cardiovascular: Negative for chest pain  Gastrointestinal: Negative for abdominal pain  Genitourinary: Negative for difficulty urinating  Musculoskeletal: Positive for arthralgias  Negative for joint swelling  Skin: Negative for rash and wound  Neurological: Negative for weakness and numbness  Hematological: Does not bruise/bleed easily  Psychiatric/Behavioral: Negative for self-injury  Objective:  Vitals:    03/01/18 0802   BP: 118/84   BP Location: Left leg   Patient Position: Sitting   Cuff Size: Large   Pulse: 70   Resp: 12   Weight: 81 1 kg (178 lb 12 8 oz)   Height: 5' 6" (1 676 m)         Observations     Right Wrist/Hand   Negative for deformity  Tenderness     Additional Tenderness Details  Right: Middle finger  - volar aspect base of middle phalanx, ulnar aspect of base of middle phalanx   Volar aspect of DIP joint    Active Range of Motion     Right Digits   Flexion   Middle     PIP: 45    DIP: 30    Additional Active Range of Motion Details  Right middle finger: Limited ROM due to pain    Passive Range of Motion     Right Digits   Flexion   Middle     PIP: 45    DIP: 30    Additional Passive Range of Motion Details  Right middle finger: Limited ROM due to pain      Physical Exam   Constitutional: He is oriented to person, place, and time  He appears well-developed  No distress  HENT:   Head: Normocephalic and atraumatic  Eyes: Conjunctivae are normal    Neck: No tracheal deviation present  Cardiovascular: Normal rate  Pulmonary/Chest: Effort normal  No respiratory distress  Abdominal: He exhibits no distension  Musculoskeletal:        Right hand: He exhibits no deformity  Neurological: He is alert and oriented to person, place, and time  Skin: Skin is warm and dry  Psychiatric: He has a normal mood and affect  His behavior is normal        I have personally reviewed pertinent films in PACS and my interpretation is Right middle finger: persistence of base of middle finger fracture line with little healing

## 2018-03-08 ENCOUNTER — APPOINTMENT (OUTPATIENT)
Dept: URGENT CARE | Facility: CLINIC | Age: 68
End: 2018-03-08
Payer: OTHER MISCELLANEOUS

## 2018-03-08 ENCOUNTER — HOSPITAL ENCOUNTER (EMERGENCY)
Facility: HOSPITAL | Age: 68
Discharge: HOME | End: 2018-03-08
Admitting: EMERGENCY MEDICINE
Payer: OTHER MISCELLANEOUS

## 2018-03-08 ENCOUNTER — APPOINTMENT (EMERGENCY)
Dept: ULTRASOUND IMAGING | Facility: HOSPITAL | Age: 68
End: 2018-03-08
Payer: OTHER MISCELLANEOUS

## 2018-03-08 VITALS
HEART RATE: 67 BPM | OXYGEN SATURATION: 99 % | WEIGHT: 180.56 LBS | BODY MASS INDEX: 28.34 KG/M2 | SYSTOLIC BLOOD PRESSURE: 175 MMHG | HEIGHT: 67 IN | TEMPERATURE: 98 F | RESPIRATION RATE: 17 BRPM | DIASTOLIC BLOOD PRESSURE: 92 MMHG

## 2018-03-08 DIAGNOSIS — M79.661 RIGHT CALF PAIN: Primary | ICD-10-CM

## 2018-03-08 PROCEDURE — 99213 OFFICE O/P EST LOW 20 MIN: CPT

## 2018-03-08 PROCEDURE — 99284 EMERGENCY DEPT VISIT MOD MDM: CPT

## 2018-03-08 PROCEDURE — 93971 EXTREMITY STUDY: CPT | Performed by: SURGERY

## 2018-03-08 PROCEDURE — 93971 EXTREMITY STUDY: CPT

## 2018-03-08 NOTE — DISCHARGE INSTRUCTIONS
Leg Pain   WHAT YOU NEED TO KNOW:   Leg pain may be caused by a variety of health conditions  Your tests did not show any broken bones or blood clots  DISCHARGE INSTRUCTIONS:   Return to the emergency department if:   · You have a fever  · Your leg starts to swell  · Your leg pain gets worse  · You have numbness or tingling in your leg or toes  · You cannot put any weight on or move your leg  Contact your healthcare provider if:   · Your pain does not decrease, even after treatment  · You have questions or concerns about your condition or care  Medicines:   · NSAIDs , such as ibuprofen, help decrease swelling, pain, and fever  This medicine is available with or without a doctor's order  NSAIDs can cause stomach bleeding or kidney problems in certain people  If you take blood thinner medicine, always ask your healthcare provider if NSAIDs are safe for you  Always read the medicine label and follow directions  · Take your medicine as directed  Contact your healthcare provider if you think your medicine is not helping or if you have side effects  Tell him of her if you are allergic to any medicine  Keep a list of the medicines, vitamins, and herbs you take  Include the amounts, and when and why you take them  Bring the list or the pill bottles to follow-up visits  Carry your medicine list with you in case of an emergency  Follow up with your healthcare provider as directed: You may need more tests to find the cause of your leg pain  You may need to see an orthopedic specialist or a physical therapist  Write down your questions so you remember to ask them during your visits  Manage your leg pain:   · Rest  your injured leg so that it can heal  You may need an immobilizer, brace, or splint to limit the movement of your leg  You may need to avoid putting any weight on your leg for at least 48 hours  Return to normal activities as directed      · Ice  the injury for 20 minutes every 4 hours for up to 24 hours, or as directed  Use an ice pack, or put crushed ice in a plastic bag  Cover it with a towel to protect your skin  Ice helps prevent tissue damage and decreases swelling and pain  · Elevate  your injured leg above the level of your heart as often as you can  This will help decrease swelling and pain  If possible, prop your leg on pillows or blankets to keep the area elevated comfortably  · Use assistive devices as directed  You may need to use a cane or crutches  Assistive devices help decrease pain and pressure on your leg when you walk  Ask your healthcare provider for more information about assistive devices and how to use them correctly  · Maintain a healthy weight  Extra body weight can cause pressure and pain in your hip, knee, and ankle joints  Ask your healthcare provider how much you should weigh  Ask him to help you create a weight loss plan if you are overweight  © 2017 [address] [employer] Information is for End User's use only and may not be sold, redistributed or otherwise used for commercial purposes  All illustrations and images included in CareNotes® are the copyrighted property of [employer] [address] [employer]  or [employer]  The above information is an  only  It is not intended as medical advice for individual conditions or treatments  Talk to your doctor, nurse or pharmacist before following any medical regimen to see if it is safe and effective for you

## 2018-03-08 NOTE — ED NOTES
Pt ambulated to bathroom with steady gait  No distress observed at this time       Tha Galloway RN  03/08/18 2963

## 2018-03-10 NOTE — ED PROVIDER NOTES
History  Chief Complaint   Patient presents with    Leg Pain     Pt reports he woke up last night at 0430 with pain in the back of his right calf and into the back of knee  Pt was sent from Urgent Care to r/o DVT     56-year-old male presents to the emergency department today with complaint of intermittent right calf pain that will come from sleep at 0 400 this morning  The pain is located to the upper right medial calf and again in the one area just behind the knee but the two locations are not connected or radiating between the two locations  The pain comes in sharp episodes lasting about a minute at a time  There is no redness, no warmth, no swelling about the leg or the knee  He does not recall injuring the knee or the muscle of the lower leg  No prior history of vascular disease or DVT in his legs  Patient was seen at the urgent care for his symptoms and was referred here for ultrasound availability  Patient has no other complaints, specifically denies fevers chills, URI symptoms cough, chest pain, shortness of breath palpitations, edema in the extremities, nausea vomiting diarrhea or belly pain  Patient is healing from a right finger fracture that was sustained after a fall a few months ago was seen at this facility by me for those injuries and is doing well since  Patient is on aspirin and Plavix, no other anticoagulants  History provided by:  Patient      Prior to Admission Medications   Prescriptions Last Dose Informant Patient Reported? Taking?    Calcium Citrate-Vitamin D (CALCIUM CITRATE + PO)   Yes Yes   Sig: Take 500 mg by mouth daily   Cholecalciferol 1000 UNITS CHEW   Yes Yes   Sig: Chew 1,000 Units daily   acetaminophen (TYLENOL) 325 mg tablet   No Yes   Sig: Take 2 tablets by mouth every 6 (six) hours as needed (pain/headaches)   amLODIPine (NORVASC) 2 5 mg tablet   Yes Yes   Sig: Take 2 5 mg by mouth daily   aspirin 81 MG tablet   Yes Yes   Sig: Take 81 mg by mouth daily atorvastatin (LIPITOR) 40 mg tablet   Yes Yes   Sig: Take 40 mg by mouth daily   co-enzyme Q-10 30 MG capsule   Yes Yes   Sig: Take 100 mg by mouth daily   ketoconazole (NIZORAL) 2 % cream   Yes Yes   Sig: Apply topically as needed for itching   metoprolol tartrate (LOPRESSOR) 50 mg tablet   Yes Yes   Sig: Take 50 mg by mouth daily   nitroglycerin (NITROSTAT) 0 4 mg SL tablet   Yes Yes   Sig: Place 0 4 mg under the tongue daily   pimecrolimus (ELIDEL) 1 % cream   Yes Yes   Sig: Apply topically 2 (two) times a day as needed      Facility-Administered Medications: None       Past Medical History:   Diagnosis Date    GERD (gastroesophageal reflux disease)     Hypertension     Prostate cancer ([facility] [address] )        Past Surgical History:   Procedure Laterality Date    APPENDECTOMY      CARDIAC SURGERY      HERNIA REPAIR      TONSILLECTOMY AND ADENOIDECTOMY         Family History   Problem Relation Age of Onset    Emphysema Mother     Hypertension Father     Prostate cancer Father     Thyroid disease Sister      I have reviewed and agree with the history as documented  Social History   Substance Use Topics    Smoking status: Never Smoker    Smokeless tobacco: Never Used    Alcohol use No        Review of Systems   Constitutional: Negative for activity change, appetite change, chills, diaphoresis, fatigue, fever and unexpected weight change  HENT: Negative for congestion, ear pain, rhinorrhea, sinus pressure, sore throat and tinnitus  Eyes: Negative for visual disturbance  Respiratory: Negative for cough, chest tightness, shortness of breath and wheezing  Cardiovascular: Negative for chest pain, palpitations and leg swelling  Gastrointestinal: Negative for abdominal pain, constipation, diarrhea, nausea and vomiting  Genitourinary: Negative for dysuria, flank pain, frequency, hematuria and urgency  Musculoskeletal: Negative for arthralgias, back pain and myalgias     Skin: Negative for rash and wound    Neurological: Negative for dizziness, tremors, syncope and headaches  Physical Exam  ED Triage Vitals [03/08/18 1504]   Temperature Pulse Respirations Blood Pressure SpO2   98 °F (36 7 °C) 67 17 (!) 175/92 99 %      Temp Source Heart Rate Source Patient Position - Orthostatic VS BP Location FiO2 (%)   Temporal Monitor Sitting Left arm --      Pain Score       2           Orthostatic Vital Signs  Vitals:    03/08/18 1504   BP: (!) 175/92   Pulse: 67   Patient Position - Orthostatic VS: Sitting       Physical Exam   Constitutional: He is oriented to person, place, and time  He appears well-developed and well-nourished  No distress  HENT:   Head: Normocephalic and atraumatic  Mouth/Throat: Oropharynx is clear and moist    Eyes: Pupils are equal, round, and reactive to light  Cardiovascular: Normal rate, regular rhythm, normal heart sounds and intact distal pulses  Pulmonary/Chest: Effort normal and breath sounds normal  No respiratory distress  He exhibits no tenderness  Musculoskeletal: Normal range of motion  He exhibits no edema, tenderness or deformity  Right knee: He exhibits normal range of motion, no swelling, no effusion, no ecchymosis, no erythema and no bony tenderness  No tenderness found  No medial joint line, no lateral joint line, no MCL, no LCL and no patellar tendon tenderness noted  Right ankle: He exhibits normal range of motion and no swelling  No tenderness  Achilles tendon exhibits no pain and no defect  Right lower leg: He exhibits no tenderness, no bony tenderness, no swelling, no edema, no deformity and no laceration  Left lower leg: He exhibits no tenderness, no bony tenderness, no swelling, no edema, no deformity and no laceration  Neurological: He is alert and oriented to person, place, and time  Skin: Skin is warm and dry  Capillary refill takes less than 2 seconds  No rash noted  He is not diaphoretic  No erythema  No pallor  Psychiatric: He has a normal mood and affect  Nursing note and vitals reviewed  ED Medications  Medications - No data to display    Diagnostic Studies  Results Reviewed     None                 VAS lower limb venous duplex study, unilateral/limited   Final Result by Audrey Carter DO (03/08 1922)           preliminary negative for DVT per sonographer  Procedures  Procedures       Phone Contacts  ED Phone Contact    ED Course  ED Course      MDM  Number of Diagnoses or Management Options  Right calf pain: new and requires workup     Amount and/or Complexity of Data Reviewed  Tests in the radiology section of CPT®: reviewed and ordered  Review and summarize past medical records: yes    Patient Progress  Patient progress: stable    CritCare Time    Disposition  Final diagnoses:   Right calf pain     Time reflects when diagnosis was documented in both MDM as applicable and the Disposition within this note     Time User Action Codes Description Comment    3/8/2018  5:01 PM Lloyd Beckford [N34 [id number]] Right calf pain       ED Disposition     ED Disposition Condition Comment    Discharge  Baldemar Meier discharge to home/self care      Condition at discharge: Good        Follow-up Information     Follow up With Specialties Details Why [address] MD Family Medicine  ER followup [address] [id number] [phone]          Discharge Medication List as of 3/8/2018  5:01 PM      CONTINUE these medications which have NOT CHANGED    Details   acetaminophen (TYLENOL) 325 mg tablet Take 2 tablets by mouth every 6 (six) hours as needed (pain/headaches), Starting Wed 1/3/2018, Print      amLODIPine (NORVASC) 2 5 mg tablet Take 2 5 mg by mouth daily, Until Discontinued, Historical Med      aspirin 81 MG tablet Take 81 mg by mouth daily, Until Discontinued, Historical Med      atorvastatin (LIPITOR) 40 mg tablet Take 40 mg by mouth daily, Until Discontinued, Historical Med      Calcium Citrate-Vitamin D (CALCIUM CITRATE + PO) Take 500 mg by mouth daily, Until Discontinued, Historical Med      Cholecalciferol 1000 UNITS CHEW Chew 1,000 Units daily, Until Discontinued, Historical Med      co-enzyme Q-10 30 MG capsule Take 100 mg by mouth daily, Until Discontinued, Historical Med      ketoconazole (NIZORAL) 2 % cream Apply topically as needed for itching, Until Discontinued, Historical Med      metoprolol tartrate (LOPRESSOR) 50 mg tablet Take 50 mg by mouth daily, Until Discontinued, Historical Med      nitroglycerin (NITROSTAT) 0 4 mg SL tablet Place 0 4 mg under the tongue daily, Until Discontinued, Historical Med      pimecrolimus (ELIDEL) 1 % cream Apply topically 2 (two) times a day as needed, Until Discontinued, Historical Med           No discharge procedures on file      ED Provider  Electronically Signed by           Dick Rae PA-C  03/10/18 1320

## 2018-03-15 ENCOUNTER — OFFICE VISIT (OUTPATIENT)
Dept: OBGYN CLINIC | Facility: CLINIC | Age: 68
End: 2018-03-15
Payer: OTHER MISCELLANEOUS

## 2018-03-15 VITALS
HEART RATE: 78 BPM | BODY MASS INDEX: 28.22 KG/M2 | WEIGHT: 180.2 LBS | SYSTOLIC BLOOD PRESSURE: 128 MMHG | RESPIRATION RATE: 14 BRPM | DIASTOLIC BLOOD PRESSURE: 82 MMHG

## 2018-03-15 DIAGNOSIS — S76.311A HAMSTRING MUSCLE STRAIN, RIGHT, INITIAL ENCOUNTER: ICD-10-CM

## 2018-03-15 DIAGNOSIS — M65.9 FLEXOR TENOSYNOVITIS OF FINGER: ICD-10-CM

## 2018-03-15 DIAGNOSIS — M25.641 JOINT STIFFNESS OF HAND, RIGHT: ICD-10-CM

## 2018-03-15 DIAGNOSIS — M22.2X1 PATELLOFEMORAL SYNDROME OF RIGHT KNEE: ICD-10-CM

## 2018-03-15 DIAGNOSIS — S62.652G: Primary | ICD-10-CM

## 2018-03-15 PROCEDURE — 99213 OFFICE O/P EST LOW 20 MIN: CPT | Performed by: FAMILY MEDICINE

## 2018-03-15 NOTE — PROGRESS NOTES
Assessment/Plan:  Assessment/Plan   Diagnoses and all orders for this visit:    Closed nondisplaced fracture of middle phalanx of right middle finger with delayed healing, subsequent encounter    Joint stiffness of hand, right  -     Ambulatory referral to Occupational Therapy; Future    Patellofemoral syndrome of right knee  -     Ambulatory referral to Physical Therapy; Future    Hamstring muscle strain, right, initial encounter  -     Ambulatory referral to Physical Therapy; Future    Flexor tenosynovitis of finger  -     Ambulatory referral to Occupational Therapy; Future     27-year-old right-hand-dominant male with right middle finger injury  Discussed with patient physical exam, impression, and plan  Patient continues to have exquisite tenderness at the PIP joint and limited range of motion with passive flexion of the joint  MRI report from outside source reads: healed fracture at the base of the third finger middle phalanx, and flexor tenosynovitis  This being on his dominant hand I will refer him to occupational therapy for help improve range of motion of his middle finger  Patient complained of right knee pain and swelling  Clinical impression is calf and hamstring strain with patellofemoral syndrome  X-rays show mild patellofemoral arthritis  I will provide him with printed exercises and referral to physical therapy  He may continue with over the counter NSAIDs as needed  He will follow-up with me in 6 weeks at which point he will be re-evaluated  Subjective:   Patient ID: Ivan Mishra is a 69 y o  male  Chief Complaint   Patient presents with    Right Middle Finger - Follow-up, Pain, Fracture         69year old right hand dominant male following up for right middle finger injury from fall at work on January 3, 2018  He was found to have nondisplaced fracture of the right middle finger middle phalanx   At last visit he had persistence of limited active and passive range of motion with flexion of the PIP joint  He was sent for MRI to evaluate for volar plate disruption  Today he reports  That he does not have pain of the finger but continues to have stiffness and limited range of motion with flexion of the middle finger  He has not had any new injury to the hand or finger  Patient also reports that since the fall he has had right knee pain  Pain is described as localized to the anterior and posterior aspects of the knee, nonradiating, intermittent, worse with ambulation, and improves with rest  There is no associated locking or instability  He has been taking over the counter NSAIDs with mild relief but continues to have pain  Hand Injury   This is a new problem  The current episode started more than 1 month ago  The problem occurs intermittently  The problem has been gradually improving  Associated symptoms include arthralgias and joint swelling  Pertinent negatives include no numbness  Nothing aggravates the symptoms  He has tried immobilization and rest for the symptoms  The treatment provided moderate relief  Knee Pain   This is a new problem  The problem occurs intermittently  The problem has been unchanged  Associated symptoms include arthralgias and joint swelling  Pertinent negatives include no numbness  The symptoms are aggravated by walking and standing  He has tried rest and NSAIDs for the symptoms  The treatment provided mild relief  Review of Systems   Musculoskeletal: Positive for arthralgias and joint swelling  Neurological: Negative for numbness  Objective:  Vitals:    03/15/18 0807   BP: 128/82   BP Location: Left arm   Patient Position: Sitting   Cuff Size: Large   Pulse: 78   Resp: 14   Weight: 81 7 kg (180 lb 3 2 oz)     Right Knee Exam     Tenderness   Right knee tenderness location: Patellar undersurface, distal medial hamstring, proximal medial gastrocnemius  Range of Motion   The patient has normal right knee ROM      Tests   Varus: negative  Valgus: negative    Other   Swelling: none  Other tests: no effusion present    Comments:    Positive Patellar Inhibition          Observations     Right Wrist/Hand   Negative for deformity  Right Knee   Negative for effusion  Tenderness     Additional Tenderness Details    Right: Middle finger PIP flexor surface    Active Range of Motion     Right Wrist   Normal active range of motion    Right Digits   Extension   Middle     MCP: 90    PIP: 60    DIP: 60    Passive Range of Motion     Right Digits   Flexion   Middle     MCP: 90    PIP: 75    DIP: 75    Tests     Additional Tests Details    Right: Pain with resisted middle finger flexion      Physical Exam   Constitutional: He is oriented to person, place, and time  He appears well-developed  No distress  HENT:   Head: Normocephalic and atraumatic  Eyes: Conjunctivae are normal    Neck: No tracheal deviation present  Cardiovascular: Normal rate  Pulmonary/Chest: Effort normal  No respiratory distress  Abdominal: He exhibits no distension  Musculoskeletal:        Right knee: He exhibits no effusion  Right hand: He exhibits no deformity  Neurological: He is alert and oriented to person, place, and time  Skin: Skin is warm and dry  Psychiatric: He has a normal mood and affect  His behavior is normal        I have personally reviewed pertinent films in PACS and my interpretation is Mild patellofemoral arthritis of the right knee  MRI of right middle finger: Healed middle phalanx fracture, flexor tendon tenosynovitis

## 2018-03-15 NOTE — PATIENT INSTRUCTIONS
Knee Exercises   AMBULATORY CARE:   What you need to know about knee exercises:  Knee exercises help strengthen the muscles around your knee  Strong muscles can help reduce pain and decrease your risk of future injury  Knee exercises also help you heal after an injury or surgery  · Start slow  These are beginning exercises  Ask your healthcare provider if you need to see a physical therapist for more advanced exercises  As you get stronger, you may be able to do more sets of each exercise or add weights  · Stop if you feel pain  It is normal to feel some discomfort at first  Regular exercise will help decrease your discomfort over time  · Do the exercises on both legs  Do this so both knees remain strong  · Warm up before you do knee exercises  Walk or ride a stationary bike for 5 or 10 minutes to warm your muscles  How to perform knee stretches safely:  Always stretch before you do strengthening exercises  Do these stretching exercises again after you do the strengthening exercises  Do these stretches 4 or 5 days a week, or as directed  · Standing calf stretch: Face a wall and place both palms flat on the wall, or hold the back of a chair for balance  Keep a slight bend in your knees  Take a big step backward with one leg  Keep your other leg directly under you  Keep both heels flat and press your hips forward  Hold the stretch for 30 seconds, and then relax for 30 seconds  Switch legs  Repeat 2 or 3 times on each leg  · Standing quadriceps stretch:  Stand and place one hand against a wall or hold the back of a chair for balance  With your weight on one leg, bend your other leg and grab your ankle  Bring your heel toward your buttocks  Hold the stretch for 30 to 60 seconds  Switch legs  Repeat 2 or 3 times on each leg  · Sitting hamstring stretch:  Sit with both legs straight in front of you  Do not point or flex your toes   Place your palms on the floor and slide your hands forward until you feel the stretch  Do not round your back  Hold the stretch for 30 seconds  Repeat 2 or 3 times  How to perform knee strengthening exercises safely:  Do these exercises 4 or 5 days a week, or as directed  · Standing half squats:  Stand with your feet shoulder-width apart  Lean your back against a wall or hold the back of a chair for balance, if needed  Slowly sit down about 10 inches, as if you are going to sit in a chair  Your body weight should be mostly over your heels  Hold the squat for 5 seconds, then rise to a standing position  Do 3 sets of 10 squats to strengthen your buttocks and thighs  · Standing hamstring curls: Face a wall and place both palms flat on the wall, or hold the back of a chair for balance  With your weight on one leg, lift your other foot as close to your buttocks as you can  Hold for 5 seconds and then lower your leg  Do 2 sets of 10 curls on each leg  This exercise strengthens the muscles in the back of your thigh  · Standing calf raises:  Face a wall and place both palms flat on the wall, or hold the back of a chair for balance  Stand up straight, and do not lean  Place all your weight on one leg by lifting the other foot off the floor  Raise the heel of the foot that is on the floor as high as you can and then lower it  Do 2 sets of 10 calf raises on each leg to strengthen your calf muscles  · Straight leg lifts:  Lie on your stomach with straight legs  Fold your arms in front of you and rest your head in your arms  Tighten your leg muscles and raise one leg as high as you can  Hold for 5 seconds, then lower your leg  Do 2 sets of 10 lifts on each leg to strengthen your buttocks  · Sitting leg lifts:  Sit in a chair  Slowly straighten and raise one leg  Squeeze your thigh muscles and hold for 5 seconds  Relax and return your foot to the floor  Do 2 sets of 10 lifts on each leg   This helps strengthen the muscles in the front of your thigh  Contact your healthcare provider if:   · You have new pain or your pain becomes worse  · You have questions or concerns about your condition or care  © 2017 [employer] Information is for End User's use only and may not be sold, redistributed or otherwise used for commercial purposes  All illustrations and images included in CareNotes® are the copyrighted property of [employer]  or [employer]  The above information is an  only  It is not intended as medical advice for individual conditions or treatments  Talk to your doctor, nurse or pharmacist before following any medical regimen to see if it is safe and effective for you

## 2018-03-22 ENCOUNTER — APPOINTMENT (OUTPATIENT)
Dept: URGENT CARE | Facility: CLINIC | Age: 68
End: 2018-03-22
Payer: OTHER MISCELLANEOUS

## 2018-03-22 PROCEDURE — 99213 OFFICE O/P EST LOW 20 MIN: CPT

## 2018-04-05 ENCOUNTER — APPOINTMENT (OUTPATIENT)
Dept: URGENT CARE | Facility: CLINIC | Age: 68
End: 2018-04-05
Payer: OTHER MISCELLANEOUS

## 2018-04-05 PROCEDURE — 99213 OFFICE O/P EST LOW 20 MIN: CPT

## 2018-04-20 ENCOUNTER — APPOINTMENT (OUTPATIENT)
Dept: URGENT CARE | Facility: CLINIC | Age: 68
End: 2018-04-20
Payer: OTHER MISCELLANEOUS

## 2018-04-20 PROCEDURE — 99213 OFFICE O/P EST LOW 20 MIN: CPT

## 2018-05-04 ENCOUNTER — APPOINTMENT (OUTPATIENT)
Dept: URGENT CARE | Facility: CLINIC | Age: 68
End: 2018-05-04
Payer: OTHER MISCELLANEOUS

## 2018-05-04 ENCOUNTER — OFFICE VISIT (OUTPATIENT)
Dept: OBGYN CLINIC | Facility: CLINIC | Age: 68
End: 2018-05-04
Payer: OTHER MISCELLANEOUS

## 2018-05-04 VITALS
HEIGHT: 67 IN | HEART RATE: 74 BPM | BODY MASS INDEX: 28.28 KG/M2 | SYSTOLIC BLOOD PRESSURE: 124 MMHG | RESPIRATION RATE: 14 BRPM | WEIGHT: 180.2 LBS | DIASTOLIC BLOOD PRESSURE: 72 MMHG

## 2018-05-04 DIAGNOSIS — M65.9 FLEXOR TENOSYNOVITIS OF FINGER: ICD-10-CM

## 2018-05-04 DIAGNOSIS — M25.641 STIFFNESS OF RIGHT HAND JOINT: ICD-10-CM

## 2018-05-04 DIAGNOSIS — S62.652D CLOSED NONDISPLACED FRACTURE OF MIDDLE PHALANX OF RIGHT MIDDLE FINGER WITH ROUTINE HEALING, SUBSEQUENT ENCOUNTER: Primary | ICD-10-CM

## 2018-05-04 PROCEDURE — 99213 OFFICE O/P EST LOW 20 MIN: CPT

## 2018-05-04 PROCEDURE — 99213 OFFICE O/P EST LOW 20 MIN: CPT | Performed by: FAMILY MEDICINE

## 2018-05-04 NOTE — LETTER
May 4, 2018     Patient: Benedicto Wisdom   YOB: 1950   Date of Visit: 5/4/2018       To Whom it May Concern: Benedicto Wisdom is under my professional care  He was seen in my office on 5/4/2018  He may return to work with limitations No lifting or pulling greater than 10 pounds in the right hand until cleared by physician  He will be re-evaluated in 4 weeks  If you have any questions or concerns, please don't hesitate to call           Sincerely,          [employer], DO        CC: No Recipients

## 2018-05-04 NOTE — PROGRESS NOTES
Assessment/Plan:  Assessment/Plan   Diagnoses and all orders for this visit:    Closed nondisplaced fracture of middle phalanx of right middle finger with routine healing, subsequent encounter    Stiffness of right hand joint    Flexor tenosynovitis of finger  -     diclofenac sodium (VOLTAREN) 1 %; Apply 4 g topically 4 (four) times a day      77-year-old right-hand-dominant male status post right middle finger middle phalanx fracture and flexor tendon tenosynovitis  Discussed with patient physical exam, impression, and plan  His range of motion of the middle finger in flexion has improved  Passive range of motion is normal her active range of motion is still mildly limited to the PIP joint with 90° flexion and DIP joint 60° flexion  I will prescribe him topical Voltaren gel which he may apply to the finger few times a day and he is also taking with him to occupational therapy sessions for therapist to use  He will follow up with me in 4 weeks for re-evaluation  Subjective:   Patient ID: Jonathan Smiley is a 69 y o  male  Chief Complaint   Patient presents with    Right Middle Finger - Follow-up, Pain, Swelling         77-year-old right-hand-dominant male following up for right middle finger injury  He is status post closed fracture of the middle phalanx  He was referred to occupational therapy due to stiffness and tenosynovitis of the finger  Today reports improvement in pain and swelling the finger, as well as mobility  At previous visit he had difficulty with flexing the finger due to tenosynovitis and swelling  He is now able to make a fist in which his tip of the finger contacts his thenar eminence which he was unable to do previously  He reports mild pain at the dorsal aspect of the distal finger with forced flexion  He has been doing occupational therapy and home exercise as directed  He has not had any new injury since last visit  Hand Pain   This is a new problem   The current episode started more than 1 month ago  The problem occurs intermittently  The problem has been gradually improving  Associated symptoms include arthralgias and joint swelling  Exacerbated by: Finger use  He has tried rest (Occupational therapy) for the symptoms  The treatment provided moderate relief  Review of Systems   Musculoskeletal: Positive for arthralgias and joint swelling  Objective:  Vitals:    05/04/18 0816   BP: 124/72   BP Location: Right arm   Patient Position: Sitting   Cuff Size: Standard   Pulse: 74   Resp: 14   Weight: 81 7 kg (180 lb 3 2 oz)   Height: 5' 7" (1 702 m)     Right Hand Exam     Tenderness   Right hand tenderness location: Mild tenderness at palmar aspect over MCP joint  Range of Motion     Wrist   Extension: normal   Flexion: normal   Pronation: normal   Supination: normal     Hand   MP Middle: normal   PIP Middle: 90   DIP Middle: 60     Muscle Strength   The patient has normal right wrist strength  Strength/Myotome Testing     Right Wrist/Hand   Normal wrist strength      Physical Exam   Constitutional: He is oriented to person, place, and time  He appears well-developed  No distress  HENT:   Head: Normocephalic and atraumatic  Eyes: Conjunctivae are normal    Neck: No tracheal deviation present  Cardiovascular: Normal rate  Pulmonary/Chest: Effort normal  No respiratory distress  Abdominal: He exhibits no distension  Neurological: He is alert and oriented to person, place, and time  Skin: Skin is warm and dry  Psychiatric: He has a normal mood and affect  His behavior is normal    Vitals reviewed

## 2018-05-07 ENCOUNTER — TELEPHONE (OUTPATIENT)
Dept: OBGYN CLINIC | Facility: HOSPITAL | Age: 68
End: 2018-05-07

## 2018-05-07 DIAGNOSIS — M65.9 FLEXOR TENOSYNOVITIS OF FINGER: Primary | ICD-10-CM

## 2018-05-07 DIAGNOSIS — M25.641 STIFFNESS OF FINGER JOINT OF RIGHT HAND: ICD-10-CM

## 2018-05-07 NOTE — TELEPHONE ENCOUNTER
Patient sees Dr Brody Whitten  [address] [facility] re[facility] is calling to get new PT/OT scripts for thsis patient to continue therapy   Please fax to #[phone]

## 2018-05-08 ENCOUNTER — TELEPHONE (OUTPATIENT)
Dept: OBGYN CLINIC | Facility: CLINIC | Age: 68
End: 2018-05-08

## 2018-05-08 NOTE — TELEPHONE ENCOUNTER
----- Message from [employer], DO sent at 5/7/2018  4:29 PM EDT -----  Dexter Angel,    Please fax new OT script to [facility]       #[phone]    Thanks

## 2018-05-09 DIAGNOSIS — M22.2X1 PATELLOFEMORAL SYNDROME OF RIGHT KNEE: Primary | ICD-10-CM

## 2018-05-09 NOTE — TELEPHONE ENCOUNTER
OT script for Mr Loretta Khan was faxed on Friday 5/4, and was received per [facility]  Patient was not seen by Dr Adrian Knott for his neck issue, and [facility] was notified  A updated PT script is needed for the right knee to continue with therapy

## 2018-05-09 NOTE — TELEPHONE ENCOUNTER
[facility] is calling back stating that they need a PT script for his Right knee and neck to continue

## 2018-05-16 ENCOUNTER — APPOINTMENT (OUTPATIENT)
Dept: URGENT CARE | Facility: CLINIC | Age: 68
End: 2018-05-16
Payer: OTHER MISCELLANEOUS

## 2018-05-16 PROCEDURE — 99213 OFFICE O/P EST LOW 20 MIN: CPT

## 2018-06-01 ENCOUNTER — OFFICE VISIT (OUTPATIENT)
Dept: OBGYN CLINIC | Facility: CLINIC | Age: 68
End: 2018-06-01
Payer: OTHER MISCELLANEOUS

## 2018-06-01 VITALS
HEART RATE: 78 BPM | DIASTOLIC BLOOD PRESSURE: 78 MMHG | WEIGHT: 181.4 LBS | BODY MASS INDEX: 29.15 KG/M2 | HEIGHT: 66 IN | SYSTOLIC BLOOD PRESSURE: 118 MMHG

## 2018-06-01 DIAGNOSIS — S62.652D CLOSED NONDISPLACED FRACTURE OF MIDDLE PHALANX OF RIGHT MIDDLE FINGER WITH ROUTINE HEALING, SUBSEQUENT ENCOUNTER: Primary | ICD-10-CM

## 2018-06-01 DIAGNOSIS — M65.9 FLEXOR TENOSYNOVITIS OF FINGER: ICD-10-CM

## 2018-06-01 DIAGNOSIS — M25.641 STIFFNESS OF RIGHT HAND JOINT: ICD-10-CM

## 2018-06-01 PROCEDURE — 99213 OFFICE O/P EST LOW 20 MIN: CPT | Performed by: FAMILY MEDICINE

## 2018-06-01 NOTE — PROGRESS NOTES
Assessment/Plan:  Assessment/Plan   Diagnoses and all orders for this visit:    Closed nondisplaced fracture of middle phalanx of right middle finger with routine healing, subsequent encounter    Stiffness of right hand joint    Flexor tenosynovitis of finger        64-year-old right-hand-dominant male status post right middle finger middle phalanx fracture and flexor tendon tenosynovitis  Discussed with patient physical exam, impression, and plan  His symptoms and clinical exam are improving  He still noted to have a stiffness with full flexion of the PIP and DIP joints  I recommend he continue with occupational therapy, home exercises, and Voltaren gel  He will follow up with me in 2 months at which point he will be re-evaluated  Subjective:   Patient ID: Francesco Villalobos is a 69 y o  male  Chief Complaint   Patient presents with    Right Hand - Follow-up       64-year-old right-hand-dominant male following up for right middle finger injury  He is status post closed fracture of the middle phalanx from injury on 01/03/2018  He was referred to occupational therapy due to stiffness and tenosynovitis of the finger  He was last seen 4 weeks ago at which point was prescribed topical diclofenac gel to help with swelling of the finger  He has been doing occupational therapy and home exercises as directed  He does report mild improvement in flexion of the middle finger and improvement in strength of the hand  He has been using topical diclofenac gel and noticed resolution of pain at the base of the middle finger  He has been doing hand  exercises and notices improvement in tolerance of doing exercises  He has not had any new injury since last visit  Hand Pain   This is a new problem  The current episode started more than 1 month ago  The problem occurs intermittently  The problem has been gradually improving  Associated symptoms include arthralgias and joint swelling   Pertinent negatives include no numbness or weakness  Treatments tried: Finger use, gripping  The treatment provided moderate relief  Review of Systems   Musculoskeletal: Positive for arthralgias and joint swelling  Neurological: Negative for weakness and numbness  Objective:  Vitals:    06/01/18 0756   BP: 118/78   Pulse: 78   Weight: 82 3 kg (181 lb 6 4 oz)   Height: 5' 6" (1 676 m)     Ortho Exam    Physical Exam   Constitutional: He is oriented to person, place, and time  He appears well-developed  No distress  HENT:   Head: Normocephalic and atraumatic  Eyes: Conjunctivae are normal    Neck: No tracheal deviation present  Cardiovascular: Normal rate  Pulmonary/Chest: Effort normal  No respiratory distress  Abdominal: He exhibits no distension  Musculoskeletal:   Right hand middle finger  - mild palmar surface swelling  - mild tenderness PIP joint  - normal range of motion MCP joint   - 95° flexion PIP joint   - 75° flexion DIP joint  -normal strength with finger flexion extension  - 4+/ 5  strength   Neurological: He is alert and oriented to person, place, and time  Skin: Skin is warm and dry  Psychiatric: He has a normal mood and affect  His behavior is normal    Nursing note and vitals reviewed

## 2018-06-01 NOTE — LETTER
June 1, 2018     Patient: David Beckwith   YOB: 1950   Date of Visit: 6/1/2018       To Whom it May Concern: David Beckwith is under my professional care  He was seen in my office on 6/1/2018  He may work with limitations  No lifting or pulling more than 10 pounds using right hand  He will be re-evaluated in 2 months  If you have any questions or concerns, please don't hesitate to call           Sincerely,          [employer], DO        CC: No Recipients

## 2018-06-07 ENCOUNTER — TELEPHONE (OUTPATIENT)
Dept: OBGYN CLINIC | Facility: HOSPITAL | Age: 68
End: 2018-06-07

## 2018-06-07 NOTE — TELEPHONE ENCOUNTER
Jhon Ceron, [address] [facility] [phone]  Patient sees Dr Arias Villegas is calling because the patient needs a new script for pt  Jhon Ceron is stating that the patient's script  on 18  Jhon Ceron would like to know if it can be post dated to 18 because the patient was in yesterday  Jhon Ceron is also faxing over a poc to [phone]      Please fax new script to

## 2018-06-11 DIAGNOSIS — M65.9 FLEXOR TENOSYNOVITIS OF FINGER: Primary | ICD-10-CM

## 2018-06-19 DIAGNOSIS — M65.9 FLEXOR TENOSYNOVITIS OF FINGER: ICD-10-CM

## 2018-06-19 DIAGNOSIS — M25.641 STIFFNESS OF FINGER JOINT OF RIGHT HAND: ICD-10-CM

## 2018-06-19 DIAGNOSIS — S62.652D CLOSED NONDISPLACED FRACTURE OF MIDDLE PHALANX OF RIGHT MIDDLE FINGER WITH ROUTINE HEALING, SUBSEQUENT ENCOUNTER: Primary | ICD-10-CM

## 2018-06-26 DIAGNOSIS — S62.652D CLOSED NONDISPLACED FRACTURE OF MIDDLE PHALANX OF RIGHT MIDDLE FINGER WITH ROUTINE HEALING, SUBSEQUENT ENCOUNTER: Primary | ICD-10-CM

## 2018-06-26 DIAGNOSIS — M65.9 FLEXOR TENOSYNOVITIS OF FINGER: ICD-10-CM

## 2018-06-26 DIAGNOSIS — M25.641 STIFFNESS OF FINGER JOINT OF RIGHT HAND: ICD-10-CM

## 2018-06-26 PROBLEM — S62.652A CLOSED NONDISPLACED FRACTURE OF MIDDLE PHALANX OF RIGHT MIDDLE FINGER: Status: ACTIVE | Noted: 2018-06-26

## 2018-06-27 ENCOUNTER — TELEPHONE (OUTPATIENT)
Dept: OBGYN CLINIC | Facility: CLINIC | Age: 68
End: 2018-06-27

## 2018-06-27 NOTE — TELEPHONE ENCOUNTER
----- Message from [place] [facility] [employer] [facility] [employer] [facility] sent at 6/26/2018  3:59 PM EDT -----  Regarding: RE: Alisa Heck for PT  Sunshine,    New Rx for PT is in system  Please have sent to [facility] rehab as per their request     Thanks  ----- Message -----  From: Rachna Mabry  Sent: 6/26/2018   9:22 AM  To: [place] [facility]  Subject: New Script for PT                                Hi Dr Amy Mckeon,    [facility] PT called, and is requesting a new script for PT of the right finger because the Occupational Therapist has unexpectedly gone out on leave  Can you put a new PT script in for this?     Thank Charan Monteiro

## 2018-07-26 ENCOUNTER — TELEPHONE (OUTPATIENT)
Dept: OBGYN CLINIC | Facility: HOSPITAL | Age: 68
End: 2018-07-26

## 2018-07-26 DIAGNOSIS — M65.9 FLEXOR TENOSYNOVITIS OF FINGER: ICD-10-CM

## 2018-07-26 DIAGNOSIS — M25.641 STIFFNESS OF FINGER JOINT OF RIGHT HAND: ICD-10-CM

## 2018-07-26 DIAGNOSIS — S62.652D CLOSED NONDISPLACED FRACTURE OF MIDDLE PHALANX OF RIGHT MIDDLE FINGER WITH ROUTINE HEALING, SUBSEQUENT ENCOUNTER: Primary | ICD-10-CM

## 2018-07-26 NOTE — TELEPHONE ENCOUNTER
[facility] Dr Javier Marie  [address] [facility]  The current script will be expiring   Please fax to #[phone]

## 2018-08-03 ENCOUNTER — OFFICE VISIT (OUTPATIENT)
Dept: OBGYN CLINIC | Facility: CLINIC | Age: 68
End: 2018-08-03
Payer: OTHER MISCELLANEOUS

## 2018-08-03 VITALS
HEIGHT: 66 IN | HEART RATE: 78 BPM | RESPIRATION RATE: 12 BRPM | SYSTOLIC BLOOD PRESSURE: 112 MMHG | DIASTOLIC BLOOD PRESSURE: 72 MMHG | WEIGHT: 179.8 LBS | BODY MASS INDEX: 28.9 KG/M2

## 2018-08-03 DIAGNOSIS — M65.9 FLEXOR TENOSYNOVITIS OF FINGER: ICD-10-CM

## 2018-08-03 DIAGNOSIS — S62.652D CLOSED NONDISPLACED FRACTURE OF MIDDLE PHALANX OF RIGHT MIDDLE FINGER WITH ROUTINE HEALING, SUBSEQUENT ENCOUNTER: Primary | ICD-10-CM

## 2018-08-03 DIAGNOSIS — M25.641 STIFFNESS OF FINGER JOINT OF RIGHT HAND: ICD-10-CM

## 2018-08-03 PROCEDURE — 99213 OFFICE O/P EST LOW 20 MIN: CPT | Performed by: FAMILY MEDICINE

## 2018-08-03 NOTE — LETTER
August 3, 2018     Patient: Karo Lund   YOB: 1950   Date of Visit: 8/3/2018       To Whom it May Concern: Karo Lund is under my professional care  He was seen in my office on 8/3/2018  He may work with limitations  No lifting or pulling more than 10 pounds using right hand  If you have any questions or concerns, please don't hesitate to call           Sincerely,          Farhad Banegas DO        CC: No Recipients

## 2018-08-03 NOTE — PROGRESS NOTES
Assessment/Plan:  Assessment/Plan   Diagnoses and all orders for this visit:    Closed nondisplaced fracture of middle phalanx of right middle finger with routine healing, subsequent encounter  -     Ambulatory referral to Orthopedic Surgery; Future    Flexor tenosynovitis of finger  -     Ambulatory referral to Orthopedic Surgery; Future    Stiffness of finger joint of right hand  -     Ambulatory referral to Orthopedic Surgery; Future      70-year-old right-hand-dominant male status post right middle finger middle phalanx fracture from injury at work on 01/03/2018, with middle finger stiffness  Discussed with patient physical exam, impression, and plan  Physical exam still noted for stiffness at the PIP as well as tenderness at the PIP joint and pain with axial load  Given the patient is now 7 months since injury, and has not improved since his last visit 2 months ago had I will refer him to orthopedic hand specialist for further evaluation and recommendation of treatment option  Subjective:   Patient ID: Yasmeen Pollock is a 69 y o  male  Chief Complaint   Patient presents with    Right Middle Finger - Follow-up       70-year-old right-hand-dominant male following up for right middle finger fracture and finger stiffness from injury at work on 01/03/2018  He was last seen 2 months ago at which point he still has stiffness with flexion of the PIP joint of the finger and was advised to continue with hand therapy and use of Voltaren gel  Today reports that he has been continue with hand therapy and home exercises as directed  Does report feeling improved strength in the right hand, but has not had any improvement in flexibility of the right middle finger, with still being unable to  Completely flex the finger  He denies any pain at rest but does has pain at the PIP joint with heavy lifting which described as localized to the joint, sharp, nonradiating, and improved with neutral position   He has not had any new injury since last visit  Hand Injury   This is a chronic problem  The current episode started more than 1 month ago  The problem occurs intermittently  The problem has been gradually improving  Associated symptoms include arthralgias  Pertinent negatives include no joint swelling, numbness or weakness  Exacerbated by: Finger use  He has tried rest, immobilization and NSAIDs (Hand therapy) for the symptoms  The treatment provided moderate relief  Review of Systems   Musculoskeletal: Positive for arthralgias  Negative for joint swelling  Neurological: Negative for weakness and numbness  Objective:  Vitals:    08/03/18 0757   BP: 112/72   BP Location: Left arm   Patient Position: Sitting   Cuff Size: Large   Pulse: 78   Resp: 12   Weight: 81 6 kg (179 lb 12 8 oz)   Height: 5' 6" (1 676 m)         Observations     Right Wrist/Hand   Negative for deformity  Tenderness     Additional Tenderness Details    Right middle finger  - PIP joint volar aspect    Active Range of Motion     Right Wrist   Normal active range of motion      Physical Exam   Constitutional: He is oriented to person, place, and time  He appears well-developed  No distress  HENT:   Head: Normocephalic and atraumatic  Eyes: Conjunctivae are normal    Neck: No tracheal deviation present  Cardiovascular: Normal rate  Pulmonary/Chest: Effort normal  No respiratory distress  Abdominal: He exhibits no distension  Musculoskeletal: He exhibits tenderness  He exhibits no deformity  Right hand: He exhibits no deformity  Right middle finger  - palmar aspect soft tissue swelling proximal phalanx and PIP joint  - tenderness palmar aspect PIP joint  -  PIP flexion 90°   -DIP flexion 75°  -reproducible pain with PIP hyper extension and axial load  -4+/ 5  strength   Neurological: He is alert and oriented to person, place, and time  Skin: Skin is warm and dry  Psychiatric: He has a normal mood and affect   His behavior is normal    Nursing note and vitals reviewed

## 2018-08-10 ENCOUNTER — OFFICE VISIT (OUTPATIENT)
Dept: OBGYN CLINIC | Facility: CLINIC | Age: 68
End: 2018-08-10

## 2018-08-10 DIAGNOSIS — M65.9 FLEXOR TENOSYNOVITIS OF FINGER: ICD-10-CM

## 2018-08-10 DIAGNOSIS — M25.641 STIFFNESS OF FINGER JOINT OF RIGHT HAND: ICD-10-CM

## 2018-08-10 DIAGNOSIS — S62.652D CLOSED NONDISPLACED FRACTURE OF MIDDLE PHALANX OF RIGHT MIDDLE FINGER WITH ROUTINE HEALING, SUBSEQUENT ENCOUNTER: ICD-10-CM

## 2018-08-13 ENCOUNTER — TELEPHONE (OUTPATIENT)
Dept: OBGYN CLINIC | Facility: MEDICAL CENTER | Age: 68
End: 2018-08-13

## 2018-08-13 NOTE — TELEPHONE ENCOUNTER
Please call the patient  He had an appt scheduled on Friday but left without being seen due to the wait time  Apologize for the delay  Dr Divina Sullivan reviewed the notes from Dr Chauncey Gerard  He should continue OT  Please offer him another appointment at any of our locations  You can schedule him the first appt of the day (even if you have to double book) or the first appointment after lunch, as we tend to run very much on time for those appts    Thank you

## 2018-08-24 ENCOUNTER — OFFICE VISIT (OUTPATIENT)
Dept: OBGYN CLINIC | Facility: CLINIC | Age: 68
End: 2018-08-24
Payer: OTHER MISCELLANEOUS

## 2018-08-24 VITALS
WEIGHT: 179 LBS | HEART RATE: 54 BPM | SYSTOLIC BLOOD PRESSURE: 136 MMHG | BODY MASS INDEX: 28.77 KG/M2 | DIASTOLIC BLOOD PRESSURE: 84 MMHG | HEIGHT: 66 IN

## 2018-08-24 DIAGNOSIS — M65.331 TRIGGER FINGER, RIGHT MIDDLE FINGER: Primary | ICD-10-CM

## 2018-08-24 DIAGNOSIS — M79.644 PAIN OF FINGER OF RIGHT HAND: ICD-10-CM

## 2018-08-24 PROCEDURE — 20550 NJX 1 TENDON SHEATH/LIGAMENT: CPT | Performed by: ORTHOPAEDIC SURGERY

## 2018-08-24 PROCEDURE — 99213 OFFICE O/P EST LOW 20 MIN: CPT | Performed by: ORTHOPAEDIC SURGERY

## 2018-08-24 RX ORDER — LIDOCAINE HYDROCHLORIDE 10 MG/ML
0.5 INJECTION, SOLUTION INFILTRATION; PERINEURAL
Status: COMPLETED | OUTPATIENT
Start: 2018-08-24 | End: 2018-08-24

## 2018-08-24 RX ORDER — CICLOPIROX 1 G/100ML
SHAMPOO TOPICAL
COMMUNITY
Start: 2018-06-28

## 2018-08-24 RX ORDER — TRIAMCINOLONE ACETONIDE 40 MG/ML
20 INJECTION, SUSPENSION INTRA-ARTICULAR; INTRAMUSCULAR
Status: COMPLETED | OUTPATIENT
Start: 2018-08-24 | End: 2018-08-24

## 2018-08-24 RX ADMIN — LIDOCAINE HYDROCHLORIDE 0.5 ML: 10 INJECTION, SOLUTION INFILTRATION; PERINEURAL at 09:04

## 2018-08-24 RX ADMIN — TRIAMCINOLONE ACETONIDE 20 MG: 40 INJECTION, SUSPENSION INTRA-ARTICULAR; INTRAMUSCULAR at 09:04

## 2018-08-24 NOTE — PROGRESS NOTES
CHIEF COMPLAINT:  Chief Complaint   Patient presents with    Right Middle Finger - Pain       SUBJECTIVE:  Anibal Dennis is a 69y o  year old RHD male who presents to the office after being referred by Dr Radha Campos  Pt sustained a right middle finger middle phalanx fracture from an injury that occurred at work on 1/3/18 which has healed  Pt states he continues to have problems with his right long finger  Pt's only complaint today is that he can not make a fist with out discomfort in his right long finger  Pt states that he does not have any other complaints of pain  Pt denies numbness and tingling, Pt denies any other injuries        PAST MEDICAL HISTORY:  Past Medical History:   Diagnosis Date    Closed nondisplaced fracture of middle phalanx of right middle finger 6/26/2018    GERD (gastroesophageal reflux disease)     Hypertension     Prostate cancer ([facility] [address] )        PAST SURGICAL HISTORY:  Past Surgical History:   Procedure Laterality Date    APPENDECTOMY      CARDIAC SURGERY      HERNIA REPAIR      TONSILLECTOMY AND ADENOIDECTOMY         FAMILY HISTORY:  Family History   Problem Relation Age of Onset    Emphysema Mother     Hypertension Father     Prostate cancer Father     Thyroid disease Sister        SOCIAL HISTORY:  Social History   Substance Use Topics    Smoking status: Never Smoker    Smokeless tobacco: Never Used    Alcohol use No       MEDICATIONS:    Current Outpatient Prescriptions:     acetaminophen (TYLENOL) 325 mg tablet, Take 2 tablets by mouth every 6 (six) hours as needed (pain/headaches), Disp: 30 tablet, Rfl: 0    amLODIPine (NORVASC) 2 5 mg tablet, Take 2 5 mg by mouth daily, Disp: , Rfl:     aspirin 81 MG tablet, Take 81 mg by mouth daily, Disp: , Rfl:     atorvastatin (LIPITOR) 40 mg tablet, Take 40 mg by mouth daily, Disp: , Rfl:     Calcium Citrate-Vitamin D (CALCIUM CITRATE + PO), Take 500 mg by mouth daily, Disp: , Rfl:     Cholecalciferol 1000 UNITS CHEW, Chew 1,000 Units daily, Disp: , Rfl:     co-enzyme Q-10 30 MG capsule, Take 100 mg by mouth daily, Disp: , Rfl:     diclofenac sodium (VOLTAREN) 1 %, Apply 4 g topically 4 (four) times a day, Disp: 1 Tube, Rfl: 1    ketoconazole (NIZORAL) 2 % cream, Apply topically as needed for itching, Disp: , Rfl:     metoprolol tartrate (LOPRESSOR) 50 mg tablet, Take 50 mg by mouth daily, Disp: , Rfl:     nitroglycerin (NITROSTAT) 0 4 mg SL tablet, Place 0 4 mg under the tongue daily, Disp: , Rfl:     pimecrolimus (ELIDEL) 1 % cream, Apply topically 2 (two) times a day as needed, Disp: , Rfl:     Ciclopirox 1 % shampoo, , Disp: , Rfl:     ALLERGIES:  Allergies   Allergen Reactions    Lisinopril Throat Swelling    Shellfish-Derived Products Throat Swelling    Wasp Venom Anaphylaxis    Acetaminophen      Hot Flash  Other reaction(s): Makes pateint sweat    Cyclobenzaprine      Pass out  Other reaction(s): syncope    Other      Other reaction(s): Unknown Reaction       REVIEW OF SYSTEMS:  Review of Systems   Constitutional: Negative for chills, fever and unexpected weight change  HENT: Negative for hearing loss, nosebleeds and sore throat  Eyes: Negative for pain, redness and visual disturbance  Respiratory: Negative for cough, shortness of breath and wheezing  Cardiovascular: Negative for chest pain, palpitations and leg swelling  Gastrointestinal: Negative for abdominal pain, nausea and vomiting  Endocrine: Negative for polydipsia and polyuria  Genitourinary: Negative for dysuria and hematuria  Musculoskeletal: Negative for arthralgias, joint swelling and myalgias  Skin: Negative for rash and wound  Neurological: Negative for light-headedness, numbness and headaches  Psychiatric/Behavioral: Negative for decreased concentration, dysphoric mood and suicidal ideas  The patient is not nervous/anxious          VITALS:  Vitals:    08/24/18 0818   BP: 136/84   Pulse: (!) 54       LABS:  HgA1c: No results found for: HGBA1C  BMP:   Lab Results   Component Value Date    GLUCOSE 91 01/03/2018    CALCIUM 8 7 01/03/2018     01/03/2018    K 3 6 01/03/2018    CO2 31 01/03/2018     01/03/2018    BUN 9 01/03/2018    CREATININE 0 99 01/03/2018       _____________________________________________________  PHYSICAL EXAMINATION:  General: well developed and well nourished, alert, oriented times 3 and appears comfortable  Psychiatric: Normal  HEENT: Trachea Midline, No torticollis  Pulmonary: No audible wheezing or respiratory distress   Skin: No masses, erthema, lacerations, fluctation, ulcerations  Neurovascular: Sensation Intact to the Median, Ulnar, Radial Nerve, Motor Intact to the Median, Ulnar, Radial Nerve and Pulses Intact    MUSCULOSKELETAL EXAMINATION:  Right long finger  No tenderness over previous fracture site  Pain with forced full flexion  right long  finger:  Positive palpable nodule over the A1 pulley  Positive tenderness to palpation over A1 pulley  Positive catching   Positive clicking         ___________________________________________________  STUDIES REVIEWED:  I personally reviewed the following images of MRI Right hand 3/9/18 and my interpretation is healed fracture of the of the midle phalanx of the third finger flexor tenosynovitis      PROCEDURES PERFORMED:  Hand/upper extremity injection  Date/Time: 8/24/2018 9:04 AM  Consent given by: patient  Site marked: site marked  Timeout: Immediately prior to procedure a time out was called to verify the correct patient, procedure, equipment, support staff and site/side marked as required   Supporting Documentation  Indications: tendon swelling and pain   Procedure Details  Condition:trigger finger Location: long finger - R long A1   Preparation: Patient was prepped and draped in the usual sterile fashion  Ultrasound guidance: no  Medications administered: 0 5 mL lidocaine 1 %; 20 mg triamcinolone acetonide 40 mg/mL  Patient tolerance: patient tolerated the procedure well with no immediate complications  Dressing:  Sterile dressing applied            _____________________________________________________  ASSESSMENT/PLAN:    Right long finger trigger digit   *CSI was administered today without complications  *Pt will apply warmth for discomfort and stiffness   *Pt will call the office if he has any questions or concerns          Follow Up:  Return in about 2 weeks (around 9/7/2018)  To Do Next Visit:  Re-evaluation of current issue    General Discussions:  Trigger Finger: The anatomy and physiology of trigger finger was discussed with the patient today in the office  Edema and increased contact pressure within the flexor tendons at the A1 pulley can cause pain, crepitation, and triggering or locking of the digit resulting in limitation of function  Symptoms can occur at anytime but are typically worse in the morning or after a brief rest from repetitive activity  Treatment options include resting/nighttime MP blocking splints to decrease edema, oral anti-inflammatory medications, home or formal therapy exercises, up to 2 steroid injections within the tendon sheath, or surgical release  While majority of patients do respond to conservative treatment, up to 20% may require surgical release           Scribe Attestation    I,:   Rosy Jamil am acting as a scribe while in the presence of the attending physician :        I,:   Juliana Rollins MD personally performed the services described in this documentation    as scribed in my presence :

## 2018-09-04 DIAGNOSIS — M65.9 FLEXOR TENOSYNOVITIS OF FINGER: ICD-10-CM

## 2018-09-07 ENCOUNTER — OFFICE VISIT (OUTPATIENT)
Dept: OBGYN CLINIC | Facility: CLINIC | Age: 68
End: 2018-09-07
Payer: OTHER MISCELLANEOUS

## 2018-09-07 VITALS
DIASTOLIC BLOOD PRESSURE: 76 MMHG | BODY MASS INDEX: 28.77 KG/M2 | WEIGHT: 179 LBS | SYSTOLIC BLOOD PRESSURE: 122 MMHG | HEART RATE: 60 BPM | HEIGHT: 66 IN

## 2018-09-07 DIAGNOSIS — M65.331 TRIGGER MIDDLE FINGER OF RIGHT HAND: Primary | ICD-10-CM

## 2018-09-07 PROCEDURE — 99212 OFFICE O/P EST SF 10 MIN: CPT | Performed by: ORTHOPAEDIC SURGERY

## 2018-09-07 RX ORDER — CHLORHEXIDINE GLUCONATE 0.12 MG/ML
RINSE ORAL
COMMUNITY
Start: 2018-09-04

## 2018-09-07 NOTE — PROGRESS NOTES
CHIEF COMPLAINT:  Chief Complaint   Patient presents with    Right Hand - Follow-up       SUBJECTIVE:  David Beckwith is a 69y o  year old RHD male who presents for follow up to a right long trigger finger s/p cortisone injection on 8-24-18  He injured his right long finger middle phalanx fracture in January 2018  He states he is 80% better after the injection  Denies clicking or locking  Denies numbness or tingling  Denies pain  He states he still has some slight stiffness in the long finger when making a fist at the DIP joint          PAST MEDICAL HISTORY:  Past Medical History:   Diagnosis Date    Closed nondisplaced fracture of middle phalanx of right middle finger 6/26/2018    GERD (gastroesophageal reflux disease)     Hypertension     [facility] cancer ([facility] [address] )        PAST SURGICAL HISTORY:  Past Surgical History:   Procedure Laterality Date    APPENDECTOMY      CARDIAC SURGERY      HERNIA REPAIR      TONSILLECTOMY AND ADENOIDECTOMY         FAMILY HISTORY:  Family History   Problem Relation Age of Onset    Emphysema Mother     Hypertension Father     Prostate cancer Father     Thyroid disease Sister        SOCIAL HISTORY:  Social History   Substance Use Topics    Smoking status: Never Smoker    Smokeless tobacco: Never Used    Alcohol use No       MEDICATIONS:    Current Outpatient Prescriptions:     acetaminophen (TYLENOL) 325 mg tablet, Take 2 tablets by mouth every 6 (six) hours as needed (pain/headaches), Disp: 30 tablet, Rfl: 0    amLODIPine (NORVASC) 2 5 mg tablet, Take 2 5 mg by mouth daily, Disp: , Rfl:     aspirin 81 MG tablet, Take 81 mg by mouth daily, Disp: , Rfl:     atorvastatin (LIPITOR) 40 mg tablet, Take 40 mg by mouth daily, Disp: , Rfl:     Calcium Citrate-Vitamin D (CALCIUM CITRATE + PO), Take 500 mg by mouth daily, Disp: , Rfl:     chlorhexidine (PERIDEX) 0 12 % solution, , Disp: , Rfl:     Cholecalciferol 1000 UNITS CHEW, Chew 1,000 Units daily, Disp: , Rfl:    Ciclopirox 1 % shampoo, , Disp: , Rfl:     co-enzyme Q-10 30 MG capsule, Take 100 mg by mouth daily, Disp: , Rfl:     diclofenac sodium (VOLTAREN) 1 %, APPLY 4 G TOPICALLY 4 (FOUR) TIMES A DAY, Disp: 100 g, Rfl: 1    ketoconazole (NIZORAL) 2 % cream, Apply topically as needed for itching, Disp: , Rfl:     metoprolol tartrate (LOPRESSOR) 50 mg tablet, Take 50 mg by mouth daily, Disp: , Rfl:     nitroglycerin (NITROSTAT) 0 4 mg SL tablet, Place 0 4 mg under the tongue daily, Disp: , Rfl:     pimecrolimus (ELIDEL) 1 % cream, Apply topically 2 (two) times a day as needed, Disp: , Rfl:     ALLERGIES:  Allergies   Allergen Reactions    Lisinopril Throat Swelling    Shellfish-Derived Products Throat Swelling    Wasp Venom Anaphylaxis    Acetaminophen      Hot Flash  Other reaction(s): Makes pateint sweat    Cyclobenzaprine      Pass out  Other reaction(s): syncope    Other      Other reaction(s): Unknown Reaction       REVIEW OF SYSTEMS:  Review of Systems   Constitutional: Negative for chills, fever and unexpected weight change  HENT: Negative for hearing loss, nosebleeds and sore throat  Eyes: Negative for pain, redness and visual disturbance  Respiratory: Negative for cough, shortness of breath and wheezing  Cardiovascular: Negative for chest pain, palpitations and leg swelling  Gastrointestinal: Negative for abdominal pain, nausea and vomiting  Endocrine: Negative for polydipsia and polyuria  Genitourinary: Negative for dysuria and hematuria  Musculoskeletal: Positive for arthralgias  Negative for joint swelling and myalgias  Skin: Negative for rash and wound  Neurological: Negative for dizziness, numbness and headaches  Psychiatric/Behavioral: Negative for decreased concentration and suicidal ideas  The patient is not nervous/anxious          VITALS:  Vitals:    09/07/18 0814   BP: 122/76   Pulse: 60       LABS:  HgA1c: No results found for: HGBA1C  BMP:   Lab Results   Component Value Date    CALCIUM 8 7 01/03/2018     01/03/2018    K 3 6 01/03/2018    CO2 31 01/03/2018     01/03/2018    BUN 9 01/03/2018    CREATININE 0 99 01/03/2018       _____________________________________________________  PHYSICAL EXAMINATION:  General: well developed and well nourished, alert, oriented times 3 and appears comfortable  Psychiatric: Normal  HEENT: Trachea Midline, No torticollis  Pulmonary: No audible wheezing or respiratory distress   Skin: No masses, erthema, lacerations, fluctation, ulcerations  Neurovascular: Sensation Intact to the Median, Ulnar, Radial Nerve, Motor Intact to the Median, Ulnar, Radial Nerve and Pulses Intact    MUSCULOSKELETAL EXAMINATION:  right long finger:  Positive palpable nodule over the A1 pulley  Negative tenderness to palpation over A1 pulley  Negative catching  Positive clicking         ___________________________________________________  STUDIES REVIEWED:  No studies reviewed  PROCEDURES PERFORMED:  Procedures  No Procedures performed today    _____________________________________________________  ASSESSMENT/PLAN:    Right long trigger finger, greatly improved  * Heat and massage would be helpful  * f/u prn       Follow Up:  Return if symptoms worsen or fail to improve        To Do Next Visit:  Re-evaluation of current issue      Scribe Attestation    I,:   Bebo Dumont PA-C am acting as a scribe while in the presence of the attending physician :        I,:   Cathy Bustillos MD personally performed the services described in this documentation    as scribed in my presence :

## 2018-09-10 ENCOUNTER — TELEPHONE (OUTPATIENT)
Dept: OBGYN CLINIC | Facility: HOSPITAL | Age: 68
End: 2018-09-10

## 2018-09-10 NOTE — TELEPHONE ENCOUNTER
Caller: nelson Knight,   Call back number: [phone] ext [phone]  Fax number: [phone]  Patient's doctor: Dr Gray Barney    Patient needs a work status   Please fax

## 2018-11-20 NOTE — PROGRESS NOTES
Assessment/Plan:  Assessment/Plan   Diagnoses and all orders for this visit:    Trigger finger, right middle finger  -     XR finger right third digit-middle    Nondisplaced fracture of middle phalanx of right middle finger, initial encounter for closed fracture    Right wrist pain  -     XR wrist 3+ vw right      69year old right hand dominant male with right middle finger fracture  Discussed with patient physical exam, radiographic interpretation, impression and plan  His physical exam is significant for tenderness at the base of the right middle finger middle phalanx, consistent with the findings on X-ray  There is also noted tenderness of the scaphoid, but X-ray was negative for gross osseous abnormality  He is to continue with wearing the finger splint and limit removing the splint and bending the finger  He will follow-up with me in 4 weeks at which point he will be re-evaluated  Subjective:   Patient ID: Jerry Jones is a 69 y o  male  69year old right hand dominant male presents for evaluation of right middle finger pain sustained from fall injury at work on January 3,2018  He was stepping down off a ladder and misstepped and fell backward and braced the fall with his hand  He had immediate pain he described as soreness localized to the right hand and middle finger  He was evaluated at the ED with CT imaging of neck and X-rays of the knee, hand, and elbow, and they were negative for acute osseous injury  Afterwards there was persistent pain of the right middle finger, associated swelling, worse with direct pressure, and improved with rest  He returned to urgent care on 1/23/18 where repeat X-ray of the hand showed nondisplaced fracture of the base of the middle phalanx  He was placed in a splint and advised to follow-up with orthopedic care  He currently denies any pain, numbness or tingling of the finger  He denies previous injury of the middle finger  Hand Pain   This is a new problem  Episode onset: 4 weeks  The problem occurs constantly  The problem has been gradually improving  Associated symptoms include arthralgias and joint swelling  Pertinent negatives include no abdominal pain, chest pain, chills, fever, numbness, rash, sore throat or weakness  Exacerbated by: direct pressure  He has tried immobilization for the symptoms  The treatment provided moderate relief  The following portions of the patient's history were reviewed and updated as appropriate: allergies, current medications, past family history, past medical history, past social history, past surgical history and problem list     Review of Systems   Constitutional: Negative for chills and fever  HENT: Negative for sore throat  Eyes: Negative for visual disturbance  Respiratory: Negative for shortness of breath  Cardiovascular: Negative for chest pain  Gastrointestinal: Negative for abdominal pain  Genitourinary: Negative for difficulty urinating  Musculoskeletal: Positive for arthralgias and joint swelling  Skin: Negative for rash and wound  Neurological: Negative for weakness and numbness  Hematological: Does not bruise/bleed easily  Psychiatric/Behavioral: Negative for self-injury  Objective:  Right Hand Exam     Tenderness   The patient is experiencing tenderness in the snuff box (Base of 3rd finger middle phalanx)  Range of Motion     Wrist   Extension: normal   Flexion: normal   Pronation: normal   Supination: normal     Muscle Strength   The patient has normal right wrist strength  Other   Sensation: normal  Pulse: present    Comments:  Intact flexion and extension mechanism of 3rd finger  Pain with Active PIP motion  No collateral ligament laxity    Tenderness of scaphoid          Strength/Myotome Testing     Right Wrist/Hand   Normal wrist strength      Physical Exam   Constitutional: He is oriented to person, place, and time  He appears well-developed  No distress     HENT: Head: Normocephalic and atraumatic  Eyes: Conjunctivae are normal    Neck: No tracheal deviation present  Cardiovascular: Normal rate  Pulmonary/Chest: Effort normal  No respiratory distress  Abdominal: He exhibits no distension  Neurological: He is alert and oriented to person, place, and time  Skin: Skin is warm and dry  Psychiatric: He has a normal mood and affect  His behavior is normal        I have personally reviewed pertinent films in PACS and my interpretation is Nondisplaced fracture at base of right middle finger middle phalanx   No acute osseous abnormality of the wrist  Bilateral Helical Rim Advancement Flap Text: The defect edges were debeveled with a #15 blade scalpel.  Given the location of the defect and the proximity to free margins (helical rim) a bilateral helical rim advancement flap was deemed most appropriate.  Using a sterile surgical marker, the appropriate advancement flaps were drawn incorporating the defect and placing the expected incisions between the helical rim and antihelix where possible.  The area thus outlined was incised through and through with a #15 scalpel blade.  With a skin hook and iris scissors, the flaps were gently and sharply undermined and freed up.

## 2019-10-29 ENCOUNTER — APPOINTMENT (EMERGENCY)
Dept: RADIOLOGY | Facility: HOSPITAL | Age: 69
End: 2019-10-29
Payer: COMMERCIAL

## 2019-10-29 ENCOUNTER — HOSPITAL ENCOUNTER (EMERGENCY)
Facility: HOSPITAL | Age: 69
Discharge: HOME | End: 2019-10-30
Attending: EMERGENCY MEDICINE
Payer: COMMERCIAL

## 2019-10-29 ENCOUNTER — APPOINTMENT (EMERGENCY)
Dept: CT IMAGING | Facility: HOSPITAL | Age: 69
End: 2019-10-29
Payer: COMMERCIAL

## 2019-10-29 DIAGNOSIS — S09.90XA CLOSED HEAD INJURY, INITIAL ENCOUNTER: ICD-10-CM

## 2019-10-29 DIAGNOSIS — S16.1XXA STRAIN OF NECK MUSCLE, INITIAL ENCOUNTER: ICD-10-CM
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
## 2019-10-29 DIAGNOSIS — S80.02XA CONTUSION OF LEFT KNEE, INITIAL ENCOUNTER: ICD-10-CM

## 2019-10-29 PROCEDURE — 71045 X-RAY EXAM CHEST 1 VIEW: CPT

## 2019-10-29 PROCEDURE — 99285 EMERGENCY DEPT VISIT HI MDM: CPT

## 2019-10-29 PROCEDURE — 99284 EMERGENCY DEPT VISIT MOD MDM: CPT | Performed by: EMERGENCY MEDICINE

## 2019-10-29 PROCEDURE — 72125 CT NECK SPINE W/O DYE: CPT

## 2019-10-29 PROCEDURE — 73564 X-RAY EXAM KNEE 4 OR MORE: CPT

## 2019-10-29 PROCEDURE — 70450 CT HEAD/BRAIN W/O DYE: CPT

## 2019-10-29 RX ORDER — ACETAMINOPHEN 325 MG/1
650 TABLET ORAL ONCE
Status: COMPLETED | OUTPATIENT
Start: 2019-10-29 | End: 2019-10-30

## 2019-10-30 ENCOUNTER — APPOINTMENT (EMERGENCY)
Dept: RADIOLOGY | Facility: HOSPITAL | Age: 69
End: 2019-10-30
Payer: COMMERCIAL

## 2019-10-30 VITALS
OXYGEN SATURATION: 95 % | WEIGHT: 182.1 LBS | DIASTOLIC BLOOD PRESSURE: 74 MMHG | RESPIRATION RATE: 18 BRPM | TEMPERATURE: 97.5 F | HEART RATE: 59 BPM | SYSTOLIC BLOOD PRESSURE: 144 MMHG | BODY MASS INDEX: 29.39 KG/M2

## 2019-10-30 LAB
ANION GAP SERPL CALCULATED.3IONS-SCNC: 7 MMOL/L (ref 4–13)
ATRIAL RATE: 61 BPM
BASOPHILS # BLD AUTO: 0.03 THOUSANDS/ΜL (ref 0–0.1)
BASOPHILS NFR BLD AUTO: 0 % (ref 0–1)
BUN SERPL-MCNC: 11 MG/DL (ref 5–25)
CALCIUM SERPL-MCNC: 9.1 MG/DL (ref 8.3–10.1)
CHLORIDE SERPL-SCNC: 103 MMOL/L (ref 100–108)
CO2 SERPL-SCNC: 29 MMOL/L (ref 21–32)
CREAT SERPL-MCNC: 1.09 MG/DL (ref 0.6–1.3)
EOSINOPHIL # BLD AUTO: 0.1 THOUSAND/ΜL (ref 0–0.61)
EOSINOPHIL NFR BLD AUTO: 1 % (ref 0–6)
ERYTHROCYTE [DISTWIDTH] IN BLOOD BY AUTOMATED COUNT: 13.1 % (ref 11.6–15.1)
GFR SERPL CREATININE-BSD FRML MDRD: 68 ML/MIN/1.73SQ M
GLUCOSE SERPL-MCNC: 119 MG/DL (ref 65–140)
HCT VFR BLD AUTO: 49.1 % (ref 36.5–49.3)
HGB BLD-MCNC: 16.8 G/DL (ref 12–17)
IMM GRANULOCYTES # BLD AUTO: 0.01 THOUSAND/UL (ref 0–0.2)
IMM GRANULOCYTES NFR BLD AUTO: 0 % (ref 0–2)
LYMPHOCYTES # BLD AUTO: 1.08 THOUSANDS/ΜL (ref 0.6–4.47)
LYMPHOCYTES NFR BLD AUTO: 13 % (ref 14–44)
MCH RBC QN AUTO: 32.3 PG (ref 26.8–34.3)
MCHC RBC AUTO-ENTMCNC: 34.2 G/DL (ref 31.4–37.4)
MCV RBC AUTO: 94 FL (ref 82–98)
MONOCYTES # BLD AUTO: 0.61 THOUSAND/ΜL (ref 0.17–1.22)
MONOCYTES NFR BLD AUTO: 7 % (ref 4–12)
NEUTROPHILS # BLD AUTO: 6.47 THOUSANDS/ΜL (ref 1.85–7.62)
NEUTS SEG NFR BLD AUTO: 79 % (ref 43–75)
NRBC BLD AUTO-RTO: 0 /100 WBCS
P AXIS: 61 DEGREES
PLATELET # BLD AUTO: 232 THOUSANDS/UL (ref 149–390)
PMV BLD AUTO: 10.2 FL (ref 8.9–12.7)
POTASSIUM SERPL-SCNC: 3.6 MMOL/L (ref 3.5–5.3)
PR INTERVAL: 166 MS
QRS AXIS: 37 DEGREES
QRSD INTERVAL: 104 MS
QT INTERVAL: 404 MS
QTC INTERVAL: 406 MS
RBC # BLD AUTO: 5.2 MILLION/UL (ref 3.88–5.62)
SODIUM SERPL-SCNC: 139 MMOL/L (ref 136–145)
T WAVE AXIS: 47 DEGREES
TROPONIN I SERPL-MCNC: <0.02 NG/ML
VENTRICULAR RATE: 61 BPM
WBC # BLD AUTO: 8.3 THOUSAND/UL (ref 4.31–10.16)

## 2019-10-30 PROCEDURE — 93005 ELECTROCARDIOGRAM TRACING: CPT

## 2019-10-30 PROCEDURE — 85025 COMPLETE CBC W/AUTO DIFF WBC: CPT | Performed by: EMERGENCY MEDICINE

## 2019-10-30 PROCEDURE — 84484 ASSAY OF TROPONIN QUANT: CPT | Performed by: EMERGENCY MEDICINE

## 2019-10-30 PROCEDURE — 71046 X-RAY EXAM CHEST 2 VIEWS: CPT

## 2019-10-30 PROCEDURE — 80048 BASIC METABOLIC PNL TOTAL CA: CPT | Performed by: EMERGENCY MEDICINE

## 2019-10-30 PROCEDURE — 36415 COLL VENOUS BLD VENIPUNCTURE: CPT | Performed by: EMERGENCY MEDICINE

## 2019-10-30 PROCEDURE — 93010 ELECTROCARDIOGRAM REPORT: CPT | Performed by: INTERNAL MEDICINE

## 2019-10-30 RX ORDER — SODIUM CHLORIDE FOR INHALATION 0.9 %
9 VIAL, NEBULIZER (ML) INHALATION ONCE
Status: DISCONTINUED | OUTPATIENT
Start: 2019-10-30 | End: 2019-10-30

## 2019-10-30 RX ADMIN — ACETAMINOPHEN 650 MG: 325 TABLET, FILM COATED ORAL at 00:30
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
## 2019-11-27 ENCOUNTER — OFFICE VISIT (OUTPATIENT)
Dept: URGENT CARE | Facility: CLINIC | Age: 69
End: 2019-11-27
Payer: COMMERCIAL

## 2019-11-27 VITALS
HEIGHT: 66 IN | TEMPERATURE: 97.8 F | WEIGHT: 175 LBS | HEART RATE: 65 BPM | OXYGEN SATURATION: 96 % | RESPIRATION RATE: 18 BRPM | SYSTOLIC BLOOD PRESSURE: 160 MMHG | DIASTOLIC BLOOD PRESSURE: 80 MMHG | BODY MASS INDEX: 28.12 KG/M2

## 2019-11-27 DIAGNOSIS — M54.50 ACUTE LEFT-SIDED LOW BACK PAIN WITHOUT SCIATICA: Primary | ICD-10-CM

## 2019-11-27 LAB
SL AMB  POCT GLUCOSE, UA: NORMAL
SL AMB LEUKOCYTE ESTERASE,UA: NORMAL
SL AMB POCT BILIRUBIN,UA: NORMAL
SL AMB POCT BLOOD,UA: NORMAL
SL AMB POCT CLARITY,UA: CLEAR
SL AMB POCT COLOR,UA: YELLOW
SL AMB POCT KETONES,UA: NORMAL
SL AMB POCT NITRITE,UA: NORMAL
SL AMB POCT PH,UA: 7
SL AMB POCT SPECIFIC GRAVITY,UA: 1
SL AMB POCT URINE PROTEIN: NORMAL
SL AMB POCT UROBILINOGEN: 0.2

## 2019-11-27 PROCEDURE — 99213 OFFICE O/P EST LOW 20 MIN: CPT | Performed by: PHYSICIAN ASSISTANT

## 2019-11-27 PROCEDURE — 81002 URINALYSIS NONAUTO W/O SCOPE: CPT | Performed by: PHYSICIAN ASSISTANT

## 2019-11-27 RX ORDER — TRAMADOL HYDROCHLORIDE 50 MG/1
50 TABLET ORAL EVERY 6 HOURS PRN
Qty: 30 TABLET | Refills: 0 | Status: SHIPPED | OUTPATIENT
Start: 2019-11-27

## 2019-11-27 NOTE — PROGRESS NOTES
[address] [facility]        NAME: Fahad Lindsey is a 68 y o  male  : 1950    MRN: [id number]  DATE: 2019  TIME: 2:56 PM    Assessment and Plan   Acute left-sided low back pain without sciatica [M54 5]  1  Acute left-sided low back pain without sciatica  POCT urine dip    traMADol (ULTRAM) 50 mg tablet         Patient Instructions     POCT urine dip negative  Based on history, pain is likely related to nephrolithiasis  Will provide analgesia, and recommend patient proceed to the ED immediately if any worsening pain, fever, N/V/D  Otherwise, should f/u with PCP/urologist in 3-5 days  Chief Complaint     Chief Complaint   Patient presents with    Back Pain     Left backpain started Monday and now is radiating into left groin  History of Present Illness       67 y/o M w h/o nephrolithiasis presents for eval of L lower back pain onset 2 days ago which now radiates into R lower groin/pelvis with associated nausea and fatigue  He denies fever, chills, vomiting, diarrhea, constipation  Denies known injury or muscle strain  No urinary sxs - no hematuria, dysuria, urgency or frequency  He states he has had incidental findings of kidney stones on imaging in the past and believes he has passed them on his own at home before multiple times but never had a formal diagnosis or treatment  He has tried OTC analgesics for pain without improvement  Review of Systems   Review of Systems   All other systems reviewed and are negative          Current Medications       Current Outpatient Medications:     acetaminophen (TYLENOL) 325 mg tablet, Take 2 tablets by mouth every 6 (six) hours as needed (pain/headaches), Disp: 30 tablet, Rfl: 0    amLODIPine (NORVASC) 2 5 mg tablet, Take 2 5 mg by mouth daily, Disp: , Rfl:     aspirin 81 MG tablet, Take 81 mg by mouth daily, Disp: , Rfl:     atorvastatin (LIPITOR) 40 mg tablet, Take 40 mg by mouth daily, Disp: , Rfl:     Calcium Citrate-Vitamin D (CALCIUM CITRATE + PO), Take 500 mg by mouth daily, Disp: , Rfl:     chlorhexidine (PERIDEX) 0 12 % solution, , Disp: , Rfl:     Cholecalciferol 1000 UNITS CHEW, Chew 5,000 Units daily , Disp: , Rfl:     Ciclopirox 1 % shampoo, , Disp: , Rfl:     co-enzyme Q-10 30 MG capsule, Take 100 mg by mouth daily, Disp: , Rfl:     cyanocobalamin (VITAMIN B-12) 100 MCG tablet, Take 100 mcg by mouth daily, Disp: , Rfl:     diclofenac sodium (VOLTAREN) 1 %, APPLY 4 G TOPICALLY 4 (FOUR) TIMES A DAY, Disp: 100 g, Rfl: 1    ketoconazole (NIZORAL) 2 % cream, Apply topically as needed for itching, Disp: , Rfl:     metoprolol tartrate (LOPRESSOR) 50 mg tablet, Take 25 mg by mouth every 12 (twelve) hours , Disp: , Rfl:     nitroglycerin (NITROSTAT) 0 4 mg SL tablet, Place 0 4 mg under the tongue daily, Disp: , Rfl:     pimecrolimus (ELIDEL) 1 % cream, Apply topically 2 (two) times a day as needed, Disp: , Rfl:     traMADol (ULTRAM) 50 mg tablet, Take 1 tablet (50 mg total) by mouth every 6 (six) hours as needed for moderate pain, Disp: 30 tablet, Rfl: 0    Current Allergies     Allergies as of 11/27/2019 - Reviewed 11/27/2019   Allergen Reaction Noted    Cyclobenzaprine Syncope 02/25/2015    Lisinopril Throat Swelling 01/11/2017    Shellfish-derived products Throat Swelling 01/06/2017    Wasp venom Anaphylaxis 04/06/2017    Wasp venom protein Anaphylaxis 06/06/2013    Acetaminophen  02/25/2015    Adhesive [medical tape] Rash 10/29/2019            The following portions of the patient's history were reviewed and updated as appropriate: allergies, current medications, past family history, past medical history, past social history, past surgical history and problem list      Past Medical History:   Diagnosis Date    Closed nondisplaced fracture of middle phalanx of right middle finger 6/26/2018    GERD (gastroesophageal reflux disease)     Hypertension     [facility]state [facility])        Past Surgical History:   Procedure Laterality Date    APPENDECTOMY      CARDIAC SURGERY      HERNIA REPAIR      TONSILLECTOMY AND ADENOIDECTOMY         Family History   Problem Relation Age of Onset    Emphysema Mother     Hypertension Father     Prostate cancer Father     Thyroid disease Sister          Medications have been verified  Objective   /80   Pulse 65   Temp 97 8 °F (36 6 °C) (Tympanic)   Resp 18   Ht 5' 6" (1 676 m)   Wt 79 4 kg (175 lb)   SpO2 96%   BMI 28 25 kg/m²        Physical Exam     Physical Exam   Constitutional: He is oriented to person, place, and time  He appears well-developed and well-nourished  No distress  HENT:   Head: Normocephalic and atraumatic  Right Ear: Tympanic membrane, external ear and ear canal normal    Left Ear: Tympanic membrane, external ear and ear canal normal    Nose: Nose normal    Mouth/Throat: Uvula is midline, oropharynx is clear and moist and mucous membranes are normal    Eyes: Pupils are equal, round, and reactive to light  Conjunctivae and EOM are normal    Cardiovascular: Normal rate, regular rhythm and normal heart sounds  Exam reveals no gallop and no friction rub  No murmur heard  Pulmonary/Chest: Effort normal and breath sounds normal  No accessory muscle usage  No respiratory distress  He has no wheezes  He has no rhonchi  He has no rales  Abdominal: Soft  Normal appearance and bowel sounds are normal  He exhibits no distension and no mass  There is no tenderness  There is no rigidity, no rebound, no guarding and no CVA tenderness  Musculoskeletal:        Lumbar back: He exhibits normal range of motion, no tenderness, no bony tenderness and no swelling  NO CVA tenderness   Lymphadenopathy:     He has no cervical adenopathy  Neurological: He is alert and oriented to person, place, and time  No cranial nerve deficit  Skin: Skin is warm, dry and intact  No rash noted  He is not diaphoretic  Psychiatric: He has a normal mood and affect   His behavior is normal    Nursing note and vitals reviewed

## 2019-11-29 ENCOUNTER — HOSPITAL ENCOUNTER (EMERGENCY)
Facility: HOSPITAL | Age: 69
Discharge: HOME | End: 2019-11-29
Attending: EMERGENCY MEDICINE
Payer: COMMERCIAL

## 2019-11-29 ENCOUNTER — APPOINTMENT (EMERGENCY)
Dept: CT IMAGING | Facility: HOSPITAL | Age: 69
End: 2019-11-29
Payer: COMMERCIAL

## 2019-11-29 VITALS
HEIGHT: 66 IN | SYSTOLIC BLOOD PRESSURE: 167 MMHG | TEMPERATURE: 97.6 F | OXYGEN SATURATION: 99 % | RESPIRATION RATE: 18 BRPM | HEART RATE: 81 BPM | WEIGHT: 179.68 LBS | BODY MASS INDEX: 28.88 KG/M2 | DIASTOLIC BLOOD PRESSURE: 88 MMHG

## 2019-11-29 DIAGNOSIS — R10.9 LEFT FLANK PAIN: Primary | ICD-10-CM

## 2019-11-29 DIAGNOSIS — R31.29 MICROSCOPIC HEMATURIA: ICD-10-CM

## 2019-11-29 LAB
ANION GAP SERPL CALCULATED.3IONS-SCNC: 7 MMOL/L (ref 4–13)
BACTERIA UR QL AUTO: ABNORMAL /HPF
BASOPHILS # BLD AUTO: 0.03 THOUSANDS/ΜL (ref 0–0.1)
BASOPHILS NFR BLD AUTO: 0 % (ref 0–1)
BILIRUB UR QL STRIP: NEGATIVE
BUN SERPL-MCNC: 9 MG/DL (ref 5–25)
CALCIUM SERPL-MCNC: 9.4 MG/DL (ref 8.3–10.1)
CHLORIDE SERPL-SCNC: 103 MMOL/L (ref 100–108)
CLARITY UR: CLEAR
CO2 SERPL-SCNC: 31 MMOL/L (ref 21–32)
COLOR UR: YELLOW
CREAT SERPL-MCNC: 1.04 MG/DL (ref 0.6–1.3)
EOSINOPHIL # BLD AUTO: 0.13 THOUSAND/ΜL (ref 0–0.61)
EOSINOPHIL NFR BLD AUTO: 2 % (ref 0–6)
ERYTHROCYTE [DISTWIDTH] IN BLOOD BY AUTOMATED COUNT: 13.2 % (ref 11.6–15.1)
GFR SERPL CREATININE-BSD FRML MDRD: 72 ML/MIN/1.73SQ M
GLUCOSE SERPL-MCNC: 97 MG/DL (ref 65–140)
GLUCOSE UR STRIP-MCNC: NEGATIVE MG/DL
HCT VFR BLD AUTO: 48.3 % (ref 36.5–49.3)
HGB BLD-MCNC: 16.6 G/DL (ref 12–17)
HGB UR QL STRIP.AUTO: ABNORMAL
IMM GRANULOCYTES # BLD AUTO: 0.01 THOUSAND/UL (ref 0–0.2)
IMM GRANULOCYTES NFR BLD AUTO: 0 % (ref 0–2)
KETONES UR STRIP-MCNC: NEGATIVE MG/DL
LEUKOCYTE ESTERASE UR QL STRIP: NEGATIVE
LYMPHOCYTES # BLD AUTO: 1.13 THOUSANDS/ΜL (ref 0.6–4.47)
LYMPHOCYTES NFR BLD AUTO: 16 % (ref 14–44)
MCH RBC QN AUTO: 32.2 PG (ref 26.8–34.3)
MCHC RBC AUTO-ENTMCNC: 34.4 G/DL (ref 31.4–37.4)
MCV RBC AUTO: 94 FL (ref 82–98)
MONOCYTES # BLD AUTO: 0.7 THOUSAND/ΜL (ref 0.17–1.22)
MONOCYTES NFR BLD AUTO: 10 % (ref 4–12)
MUCOUS THREADS UR QL AUTO: ABNORMAL
NEUTROPHILS # BLD AUTO: 5.18 THOUSANDS/ΜL (ref 1.85–7.62)
NEUTS SEG NFR BLD AUTO: 72 % (ref 43–75)
NITRITE UR QL STRIP: NEGATIVE
NON-SQ EPI CELLS URNS QL MICRO: ABNORMAL /HPF
NRBC BLD AUTO-RTO: 0 /100 WBCS
PH UR STRIP.AUTO: 6 [PH]
PLATELET # BLD AUTO: 234 THOUSANDS/UL (ref 149–390)
PMV BLD AUTO: 8.9 FL (ref 8.9–12.7)
POTASSIUM SERPL-SCNC: 3.4 MMOL/L (ref 3.5–5.3)
PROT UR STRIP-MCNC: NEGATIVE MG/DL
RBC # BLD AUTO: 5.15 MILLION/UL (ref 3.88–5.62)
RBC #/AREA URNS AUTO: ABNORMAL /HPF
SODIUM SERPL-SCNC: 141 MMOL/L (ref 136–145)
SP GR UR STRIP.AUTO: 1.02 (ref 1–1.03)
UROBILINOGEN UR QL STRIP.AUTO: 0.2 E.U./DL
WBC # BLD AUTO: 7.18 THOUSAND/UL (ref 4.31–10.16)
WBC #/AREA URNS AUTO: ABNORMAL /HPF

## 2019-11-29 PROCEDURE — 99284 EMERGENCY DEPT VISIT MOD MDM: CPT

## 2019-11-29 PROCEDURE — 96375 TX/PRO/DX INJ NEW DRUG ADDON: CPT

## 2019-11-29 PROCEDURE — 99284 EMERGENCY DEPT VISIT MOD MDM: CPT | Performed by: EMERGENCY MEDICINE

## 2019-11-29 PROCEDURE — 96376 TX/PRO/DX INJ SAME DRUG ADON: CPT

## 2019-11-29 PROCEDURE — 85025 COMPLETE CBC W/AUTO DIFF WBC: CPT | Performed by: EMERGENCY MEDICINE

## 2019-11-29 PROCEDURE — 96361 HYDRATE IV INFUSION ADD-ON: CPT

## 2019-11-29 PROCEDURE — 36415 COLL VENOUS BLD VENIPUNCTURE: CPT | Performed by: EMERGENCY MEDICINE

## 2019-11-29 PROCEDURE — 74176 CT ABD & PELVIS W/O CONTRAST: CPT

## 2019-11-29 PROCEDURE — 81001 URINALYSIS AUTO W/SCOPE: CPT | Performed by: EMERGENCY MEDICINE

## 2019-11-29 PROCEDURE — 96374 THER/PROPH/DIAG INJ IV PUSH: CPT

## 2019-11-29 PROCEDURE — 80048 BASIC METABOLIC PNL TOTAL CA: CPT | Performed by: EMERGENCY MEDICINE

## 2019-11-29 RX ORDER — KETOROLAC TROMETHAMINE 30 MG/ML
10 INJECTION, SOLUTION INTRAMUSCULAR; INTRAVENOUS ONCE
Status: COMPLETED | OUTPATIENT
Start: 2019-11-29 | End: 2019-11-29

## 2019-11-29 RX ORDER — FENTANYL CITRATE 50 UG/ML
25 INJECTION, SOLUTION INTRAMUSCULAR; INTRAVENOUS ONCE
Status: COMPLETED | OUTPATIENT
Start: 2019-11-29 | End: 2019-11-29

## 2019-11-29 RX ORDER — PHENAZOPYRIDINE HYDROCHLORIDE 100 MG/1
100 TABLET, FILM COATED ORAL ONCE
Status: COMPLETED | OUTPATIENT
Start: 2019-11-29 | End: 2019-11-29

## 2019-11-29 RX ORDER — ONDANSETRON 2 MG/ML
4 INJECTION INTRAMUSCULAR; INTRAVENOUS ONCE
Status: COMPLETED | OUTPATIENT
Start: 2019-11-29 | End: 2019-11-29

## 2019-11-29 RX ORDER — PHENAZOPYRIDINE HYDROCHLORIDE 200 MG/1
200 TABLET, FILM COATED ORAL 3 TIMES DAILY
Qty: 5 TABLET | Refills: 0 | Status: SHIPPED | OUTPATIENT
Start: 2019-11-29

## 2019-11-29 RX ORDER — POTASSIUM CHLORIDE 20 MEQ/1
40 TABLET, EXTENDED RELEASE ORAL ONCE
Status: COMPLETED | OUTPATIENT
Start: 2019-11-29 | End: 2019-11-29

## 2019-11-29 RX ADMIN — FENTANYL CITRATE 25 MCG: 50 INJECTION, SOLUTION INTRAMUSCULAR; INTRAVENOUS at 15:10

## 2019-11-29 RX ADMIN — POTASSIUM CHLORIDE 40 MEQ: 1500 TABLET, EXTENDED RELEASE ORAL at 16:27

## 2019-11-29 RX ADMIN — PHENAZOPYRIDINE 100 MG: 100 TABLET ORAL at 16:27

## 2019-11-29 RX ADMIN — KETOROLAC TROMETHAMINE 9.9 MG: 30 INJECTION, SOLUTION INTRAMUSCULAR at 17:39

## 2019-11-29 RX ADMIN — ONDANSETRON 4 MG: 2 INJECTION INTRAMUSCULAR; INTRAVENOUS at 14:58

## 2019-11-29 RX ADMIN — SODIUM CHLORIDE 1000 ML: 0.9 INJECTION, SOLUTION INTRAVENOUS at 15:09

## 2019-11-29 RX ADMIN — KETOROLAC TROMETHAMINE 9.9 MG: 30 INJECTION, SOLUTION INTRAMUSCULAR at 15:04

## 2019-11-29 NOTE — ED NOTES
Per Dr Yeimy Daigle, radiology was called in order to obtain a copy of pt's CT scan to take to follow-up provider              Earvin Burkitt, RN  11/29/19 [id number]

## 2019-11-29 NOTE — ED PROVIDER NOTES
History  Chief Complaint   Patient presents with    Flank Pain     Having left sided flank pain that has been coming and going for about a week  No burning or difficulty urinating  History provided by:  Patient, medical records and spouse  Flank Pain   Pain location:  L flank  Pain quality: aching    Pain radiation: Left inguinal region  Pain severity:  Severe  Onset quality:  Sudden  Duration:  5 days  Timing:  Intermittent  Progression:  Waxing and waning  Chronicity:  Recurrent (Somewhat similar to sx occuring 2-3 x previously, most recently in 2017, for which patient will experience pain for several days followed by gross hematuria with resolution of sx)  Context: previous surgery (R inguinal hernia repair; prior prostatic radiation therapy ending in 2015)    Context: not recent illness, not sick contacts and not trauma    Relieved by:  Nothing  Worsened by: Movement, palpation and position changes  Ineffective treatments: Was seen in urgent care on 27 Nov 2019 and prescribed tramadol which provides partial and incomplete pain relief  Associated symptoms: nausea and vomiting (Nb/Nb vomiting x1 2d prior)    Associated symptoms: no chest pain, no chills, no cough, no diarrhea, no dysuria, no fever, no hematuria and no shortness of breath    Risk factors: has not had multiple surgeries, not obese and no recent hospitalization    Risk factors comment:  Did have KUB in 2017 in setting of hematuria demonstrating 3mm left-sided ureteral stone  No abx use in past 30d  DDx including but not limited to: pancreatitis, colitis, enteritis, diverticulitis, renal colic, pyelonephritis, UTI  Check cbc/bmp/ua and ct a/p renal stone protocol  Symptomatic treatment while workup ongoing  Prior to Admission Medications   Prescriptions Last Dose Informant Patient Reported? Taking?    Calcium Citrate-Vitamin D (CALCIUM CITRATE + PO)   Yes No   Sig: Take 500 mg by mouth daily   Cholecalciferol 1000 UNITS CHEW   Yes No Sig: Chew 5,000 Units daily    Ciclopirox 1 % shampoo   Yes No   acetaminophen (TYLENOL) 325 mg tablet   No No   Sig: Take 2 tablets by mouth every 6 (six) hours as needed (pain/headaches)   amLODIPine (NORVASC) 2 5 mg tablet   Yes No   Sig: Take 2 5 mg by mouth daily   aspirin 81 MG tablet   Yes No   Sig: Take 81 mg by mouth daily   atorvastatin (LIPITOR) 40 mg tablet   Yes No   Sig: Take 40 mg by mouth daily   chlorhexidine (PERIDEX) 0 12 % solution   Yes No   co-enzyme Q-10 30 MG capsule   Yes No   Sig: Take 100 mg by mouth daily   cyanocobalamin (VITAMIN B-12) 100 MCG tablet   Yes No   Sig: Take 100 mcg by mouth daily   diclofenac sodium (VOLTAREN) 1 % Unknown at Unknown time  No No   Sig: APPLY 4 G TOPICALLY 4 (FOUR) TIMES A DAY   ketoconazole (NIZORAL) 2 % cream Unknown at Unknown time  Yes No   Sig: Apply topically as needed for itching   metoprolol tartrate (LOPRESSOR) 50 mg tablet 11/29/2019 at Unknown time  Yes Yes   Sig: Take 25 mg by mouth every 12 (twelve) hours    nitroglycerin (NITROSTAT) 0 4 mg SL tablet Unknown at Unknown time  Yes No   Sig: Place 0 4 mg under the tongue daily   pimecrolimus (ELIDEL) 1 % cream Unknown at Unknown time  Yes No   Sig: Apply topically 2 (two) times a day as needed   traMADol (ULTRAM) 50 mg tablet 11/29/2019 at 1000  No Yes   Sig: Take 1 tablet (50 mg total) by mouth every 6 (six) hours as needed for moderate pain      Facility-Administered Medications: None       Past Medical History:   Diagnosis Date    Closed nondisplaced fracture of middle phalanx of right middle finger 6/26/2018    GERD (gastroesophageal reflux disease)     Hypertension     [facility]state [facility] [address] )        Past Surgical History:   Procedure Laterality Date    APPENDECTOMY      CARDIAC SURGERY      HERNIA REPAIR      TONSILLECTOMY AND ADENOIDECTOMY         Family History   Problem Relation Age of Onset    Emphysema Mother     Hypertension Father     Prostate cancer Father     Thyroid disease Sister      I have reviewed and agree with the history as documented  Social History     Tobacco Use    Smoking status: Never Smoker    Smokeless tobacco: Never Used   Substance Use Topics    Alcohol use: No    Drug use: No        Review of Systems   Constitutional: Negative for chills and fever  Respiratory: Negative for cough and shortness of breath  Cardiovascular: Negative for chest pain and palpitations  Gastrointestinal: Positive for abdominal pain, nausea and vomiting (Nb/Nb vomiting x1 2d prior)  Negative for diarrhea  Genitourinary: Positive for flank pain  Negative for difficulty urinating, dysuria and hematuria  Skin: Negative for color change, pallor, rash and wound  Neurological: Positive for light-headedness  Negative for syncope, weakness and numbness  Hematological: Negative for adenopathy  Does not bruise/bleed easily  All other systems reviewed and are negative  Physical Exam  Physical Exam   Constitutional: He is oriented to person, place, and time  He appears well-developed and well-nourished  He is cooperative  He appears distressed (moderate painful distress)  HENT:   Head: Normocephalic and atraumatic  Right Ear: Hearing and external ear normal    Left Ear: Hearing and external ear normal    Nose: Nose normal    Neck: Trachea normal and phonation normal  No tracheal tenderness present  No tracheal deviation present  No thyroid mass and no thyromegaly present  Cardiovascular: Normal rate, regular rhythm, S1 normal, S2 normal, normal heart sounds and intact distal pulses  Exam reveals no gallop and no friction rub  No murmur heard  Pulses:       Radial pulses are 2+ on the right side, and 2+ on the left side  Dorsalis pedis pulses are 2+ on the right side, and 2+ on the left side  Posterior tibial pulses are 2+ on the right side, and 2+ on the left side  Pulmonary/Chest: Effort normal and breath sounds normal  No stridor   No respiratory distress  He has no decreased breath sounds  He has no wheezes  He has no rhonchi  He has no rales  He exhibits no tenderness  Abdominal: Soft  He exhibits no distension and no mass  There is tenderness in the left lower quadrant  There is no rigidity, no rebound, no guarding and no CVA tenderness  Musculoskeletal: Normal range of motion  He exhibits no edema, tenderness or deformity  Neurological: He is alert and oriented to person, place, and time  GCS eye subscore is 4  GCS verbal subscore is 5  GCS motor subscore is 6  Skin: Skin is warm, dry and intact  No rash noted  He is not diaphoretic  No erythema  Nursing note and vitals reviewed        Vital Signs  ED Triage Vitals   Temperature Pulse Respirations Blood Pressure SpO2   11/29/19 1346 11/29/19 1346 11/29/19 1346 11/29/19 1346 11/29/19 1346   97 6 °F (36 4 °C) 65 16 (!) 190/96 97 %      Temp Source Heart Rate Source Patient Position - Orthostatic VS BP Location FiO2 (%)   11/29/19 1346 11/29/19 1346 11/29/19 1346 11/29/19 1346 --   Temporal Monitor Sitting Right arm       Pain Score       11/29/19 1326       1           Vitals:    11/29/19 1346 11/29/19 1546 11/29/19 1750   BP: (!) 190/96 (!) 172/86 167/88   Pulse: 65 72 81   Patient Position - Orthostatic VS: Sitting Lying Sitting         Visual Acuity      ED Medications  Medications   ondansetron (ZOFRAN) injection 4 mg (4 mg Intravenous Given 11/29/19 1458)   sodium chloride 0 9 % bolus 1,000 mL (0 mL Intravenous Stopped 11/29/19 1640)   ketorolac (TORADOL) injection 9 9 mg (9 9 mg Intravenous Given 11/29/19 1504)   fentanyl citrate (PF) 100 MCG/2ML 25 mcg (25 mcg Intravenous Given 11/29/19 1510)   potassium chloride (K-DUR,KLOR-CON) CR tablet 40 mEq (40 mEq Oral Given 11/29/19 1627)   phenazopyridine (PYRIDIUM) tablet 100 mg (100 mg Oral Given 11/29/19 1627)   ketorolac (TORADOL) injection 9 9 mg (9 9 mg Intravenous Given 11/29/19 1739)       Diagnostic Studies  Results Reviewed Procedure Component Value Units Date/Time    Urine Microscopic [[id number]]  (Abnormal) Collected:  11/29/19 1457    Lab Status:  Final result Specimen:  Urine, Clean Catch Updated:  11/29/19 1526     RBC, UA 1-2 /hpf      WBC, UA 1-2 /hpf      Epithelial Cells Occasional /hpf      Bacteria, UA Occasional /hpf      MUCUS THREADS Moderate    Basic metabolic panel [[id number]]  (Abnormal) Collected:  11/29/19 1456    Lab Status:  Final result Specimen:  Blood from Arm, Right Updated:  11/29/19 1524     Sodium 141 mmol/L      Potassium 3 4 mmol/L      Chloride 103 mmol/L      CO2 31 mmol/L      ANION GAP 7 mmol/L      BUN 9 mg/dL      Creatinine 1 04 mg/dL      Glucose 97 mg/dL      Calcium 9 4 mg/dL      eGFR 72 ml/min/1 73sq m     Narrative:       [place] guidelines for Chronic Kidney Disease (CKD):     Stage 1 with normal or high GFR (GFR > 90 mL/min/1 73 square meters)    Stage 2 Mild CKD (GFR = 60-89 mL/min/1 73 square meters)    Stage 3A Moderate CKD (GFR = 45-59 mL/min/1 73 square meters)    Stage 3B Moderate CKD (GFR = 30-44 mL/min/1 73 square meters)    Stage 4 Severe CKD (GFR = 15-29 mL/min/1 73 square meters)    Stage 5 End Stage CKD (GFR <15 mL/min/1 73 square meters)  Note: GFR calculation is accurate only with a steady state creatinine    UA w Reflex to Microscopic w Reflex to Culture [[id number]]  (Abnormal) Collected:  11/29/19 1457    Lab Status:  Final result Specimen:  Urine, Clean Catch Updated:  11/29/19 1515     Color, UA Yellow     Clarity, UA Clear     Specific [place], UA 1 020     pH, UA 6 0     Leukocytes, UA Negative     Nitrite, UA Negative     Protein, UA Negative mg/dl      Glucose, UA Negative mg/dl      Ketones, UA Negative mg/dl      Urobilinogen, UA 0 2 E U /dl      Bilirubin, UA Negative     Blood, UA Trace-Intact    CBC and differential [[id number]] Collected:  11/29/19 1456    Lab Status:  Final result Specimen:  Blood from Arm, Right Updated: 11/29/19 1512     WBC 7 18 Thousand/uL      RBC 5 15 Million/uL      Hemoglobin 16 6 g/dL      Hematocrit 48 3 %      MCV 94 fL      MCH 32 2 pg      MCHC 34 4 g/dL      RDW 13 2 %      MPV 8 9 fL      Platelets 185 Thousands/uL      nRBC 0 /100 WBCs      Neutrophils Relative 72 %      Immat GRANS % 0 %      Lymphocytes Relative 16 %      Monocytes Relative 10 %      Eosinophils Relative 2 %      Basophils Relative 0 %      Neutrophils Absolute 5 18 Thousands/µL      Immature Grans Absolute 0 01 Thousand/uL      Lymphocytes Absolute 1 13 Thousands/µL      Monocytes Absolute 0 70 Thousand/µL      Eosinophils Absolute 0 13 Thousand/µL      Basophils Absolute 0 03 Thousands/µL                  CT renal stone study abdomen pelvis wo contrast   Final Result by Josh Mcdaniel DO (11/29 1613)      Tiny nonobstructing calculus within the lower pole the left kidney  No obstructive uropathy  No acute abdominal or pelvic pathology  Workstation performed: [id number]                    Procedures  Procedures       ED Course  ED Course as of Nov 29 1809 Fri Nov 29, 2019   1527 1  WBC wnl   2  Hg/Hct wnl   3  Plt wnl   4  Electrolytes: mild hypokalemia; will replete orally  Otherwise wnl   5  UA: trace blood; otherwise wnl           1527 CT completed and awaiting interpretation  1617 Patient reports resolution of his left flank/inguinal pain but reports some suprapubic cramping abdominal pain present after voiding  He states he does feel that he emptied his bladder fully  Will check PVR and give oral Pyridium  Awaiting CT results  26 CT reviewed:  Very small left-sided nephrolithiasis without obstructing pathology  No right-sided pathology  No bladder pathology  Otherwise within normal limits  56 CT did not demonstrate obvious pathology that account for patient's symptoms    The nature of symptoms is still such that I still suspect urinary tract pathology as opposed to musculoskeletal or superficial (i e , skin) etiology  Will need urology follow-up  I will prescribe short course of NSAID and pyridium in the interval for symptom control  Has appt with [facility] urology on Monday 2 Dec 2019  Advised him to maintain this appt  Was provided with copy of images  Contacted [facility] referral line: answering service indicated that Epic records would not be electronically accessible at [facility]  All questions answered prior to discharge  Patient expressed understanding and agreed to plan  MDM  Number of Diagnoses or Management Options  Left flank pain: established and worsening  Microscopic hematuria: new and requires workup     Amount and/or Complexity of Data Reviewed  Clinical lab tests: ordered and reviewed  Tests in the radiology section of CPT®: ordered and reviewed  Decide to obtain previous medical records or to obtain history from someone other than the patient: yes  Review and summarize past medical records: yes  Independent visualization of images, tracings, or specimens: yes    Risk of Complications, Morbidity, and/or Mortality  Presenting problems: high  Diagnostic procedures: high  Management options: moderate    Patient Progress  Patient progress: stable      Disposition  Final diagnoses:   Left flank pain   Microscopic hematuria     Time reflects when diagnosis was documented in both MDM as applicable and the Disposition within this note     Time User Action Codes Description Comment    11/29/2019  4:59 PM Catrachito Vance Add [R10 9] Left flank pain     11/29/2019  4:59 PM Catrachito Vance Add [R31 29] Microscopic hematuria       ED Disposition     ED Disposition Condition Date/Time Comment    Discharge Stable Fri Nov 29, 2019  4:59 PM Neli Doherty discharge to home/self care              Follow-up Information     Follow up With Specialties Details Why Contact Info Additional Information    Your urologist  Go to  As scheduled for further evaluation [facility] Emergency Department Emergency Medicine Go to  If symptoms worsen: fever, uncontrollable pain, uncontrollable vomiting, passing out, or inability to urinate [address] [facility], [address], [place], [place], [address]          Discharge Medication List as of 11/29/2019  5:00 PM      START taking these medications    Details   diclofenac sodium (VOLTAREN) 50 mg EC tablet Take 1 tablet (50 mg total) by mouth 2 (two) times a day as needed (abodminal pain), Starting Fri 11/29/2019, Normal      phenazopyridine (PYRIDIUM) 200 mg tablet Take 1 tablet (200 mg total) by mouth 3 (three) times a day, Starting Fri 11/29/2019, Normal         CONTINUE these medications which have NOT CHANGED    Details   metoprolol tartrate (LOPRESSOR) 50 mg tablet Take 25 mg by mouth every 12 (twelve) hours , Historical Med      traMADol (ULTRAM) 50 mg tablet Take 1 tablet (50 mg total) by mouth every 6 (six) hours as needed for moderate pain, Starting Wed 11/27/2019, Normal      acetaminophen (TYLENOL) 325 mg tablet Take 2 tablets by mouth every 6 (six) hours as needed (pain/headaches), Starting Wed 1/3/2018, Print      amLODIPine (NORVASC) 2 5 mg tablet Take 2 5 mg by mouth daily, Until Discontinued, Historical Med      aspirin 81 MG tablet Take 81 mg by mouth daily, Until Discontinued, Historical Med      atorvastatin (LIPITOR) 40 mg tablet Take 40 mg by mouth daily, Until Discontinued, Historical Med      Calcium Citrate-Vitamin D (CALCIUM CITRATE + PO) Take 500 mg by mouth daily, Until Discontinued, Historical Med      chlorhexidine (PERIDEX) 0 12 % solution Starting Tue 9/4/2018, Historical Med      Cholecalciferol 1000 UNITS CHEW Chew 5,000 Units daily , Historical Med      Ciclopirox 1 % shampoo Starting Thu 6/28/2018, Historical Med      co-enzyme Q-10 30 MG capsule Take 100 mg by mouth daily, Until Discontinued, Historical Med      cyanocobalamin (VITAMIN B-12) 100 MCG tablet Take 100 mcg by mouth daily, Historical Med      diclofenac sodium (VOLTAREN) 1 % APPLY 4 G TOPICALLY 4 (FOUR) TIMES A DAY, Starting Wed 9/5/2018, Normal      ketoconazole (NIZORAL) 2 % cream Apply topically as needed for itching, Until Discontinued, Historical Med      nitroglycerin (NITROSTAT) 0 4 mg SL tablet Place 0 4 mg under the tongue daily, Until Discontinued, Historical Med      pimecrolimus (ELIDEL) 1 % cream Apply topically 2 (two) times a day as needed, Until Discontinued, Historical Med           No discharge procedures on file      ED Provider  Electronically Signed by           Valente Mandujano DO  11/29/19 3836

## 2019-11-29 NOTE — CASE MANAGEMENT
I self referred the patient to discuss resources that might be available to him  He denied needing any help at this time  The patient lives in a two story house with his wife  There is six steps to get into the house and one flight of steps inside the house  He has no medical equipment  He does not receive meals on wheels or home health services at this time  He takes care of his own ADl's and he drives  He uses [employer]  He has no trouble getting or paying for his medications  He has [facility] [employer]  He did not call his PCP prior to coming to the ED today

## 2021-01-20 ENCOUNTER — IMMUNIZATIONS (OUTPATIENT)
Dept: FAMILY MEDICINE CLINIC | Facility: HOSPITAL | Age: 71
End: 2021-01-20

## 2021-01-20 DIAGNOSIS — Z23 ENCOUNTER FOR IMMUNIZATION: Primary | ICD-10-CM

## 2021-01-20 PROCEDURE — 0011A SARS-COV-2 / COVID-19 MRNA VACCINE (MODERNA) 100 MCG: CPT

## 2021-01-20 PROCEDURE — 91301 SARS-COV-2 / COVID-19 MRNA VACCINE (MODERNA) 100 MCG: CPT

## 2021-02-17 ENCOUNTER — IMMUNIZATIONS (OUTPATIENT)
Dept: FAMILY MEDICINE CLINIC | Facility: HOSPITAL | Age: 71
End: 2021-02-17

## 2021-02-17 DIAGNOSIS — Z23 ENCOUNTER FOR IMMUNIZATION: Primary | ICD-10-CM

## 2021-02-17 PROCEDURE — 91301 SARS-COV-2 / COVID-19 MRNA VACCINE (MODERNA) 100 MCG: CPT

## 2021-02-17 PROCEDURE — 0012A SARS-COV-2 / COVID-19 MRNA VACCINE (MODERNA) 100 MCG: CPT

## 2021-10-26 ENCOUNTER — IMMUNIZATIONS (OUTPATIENT)
Dept: FAMILY MEDICINE CLINIC | Facility: HOSPITAL | Age: 71
End: 2021-10-26

## 2021-10-26 DIAGNOSIS — Z23 ENCOUNTER FOR IMMUNIZATION: Primary | ICD-10-CM

## 2021-10-26 PROCEDURE — 91301 COVID-19 MODERNA VACC 0.5 ML: CPT

## 2021-10-26 PROCEDURE — 0011A COVID-19 MODERNA VACC 0.5 ML: CPT
